# Patient Record
Sex: FEMALE | Race: WHITE
[De-identification: names, ages, dates, MRNs, and addresses within clinical notes are randomized per-mention and may not be internally consistent; named-entity substitution may affect disease eponyms.]

---

## 2018-04-01 ENCOUNTER — HOSPITAL ENCOUNTER (EMERGENCY)
Dept: HOSPITAL 61 - ER | Age: 33
Discharge: HOME | End: 2018-04-01
Payer: COMMERCIAL
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
## 2018-04-01 DIAGNOSIS — S60.021A: Primary | ICD-10-CM
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
## 2018-04-01 PROCEDURE — 99284 EMERGENCY DEPT VISIT MOD MDM: CPT

## 2018-04-01 PROCEDURE — 73140 X-RAY EXAM OF FINGER(S): CPT

## 2020-12-01 ENCOUNTER — HOSPITAL ENCOUNTER (EMERGENCY)
Dept: HOSPITAL 61 - ER | Age: 35
Discharge: HOME | End: 2020-12-01
Payer: COMMERCIAL

## 2020-12-01 VITALS — HEIGHT: 62 IN | BODY MASS INDEX: 23.94 KG/M2 | WEIGHT: 130.07 LBS

## 2020-12-01 VITALS — SYSTOLIC BLOOD PRESSURE: 117 MMHG | DIASTOLIC BLOOD PRESSURE: 67 MMHG

## 2020-12-01 DIAGNOSIS — N39.0: Primary | ICD-10-CM

## 2020-12-01 DIAGNOSIS — F17.200: ICD-10-CM

## 2020-12-01 DIAGNOSIS — R35.0: ICD-10-CM

## 2020-12-01 DIAGNOSIS — R10.9: ICD-10-CM

## 2020-12-01 LAB
APTT PPP: YELLOW S
BACTERIA #/AREA URNS HPF: (no result) /HPF
BILIRUB UR QL STRIP: NEGATIVE
FIBRINOGEN PPP-MCNC: (no result) MG/DL
NITRITE UR QL STRIP: POSITIVE
PH UR STRIP: 6 [PH]
PROT UR STRIP-MCNC: 100 MG/DL
RBC #/AREA URNS HPF: 0 /HPF (ref 0–2)
UROBILINOGEN UR-MCNC: 0.2 MG/DL
WBC #/AREA URNS HPF: (no result) /HPF (ref 0–4)

## 2020-12-01 PROCEDURE — 74018 RADEX ABDOMEN 1 VIEW: CPT

## 2020-12-01 PROCEDURE — 81001 URINALYSIS AUTO W/SCOPE: CPT

## 2020-12-01 PROCEDURE — 99284 EMERGENCY DEPT VISIT MOD MDM: CPT

## 2020-12-01 PROCEDURE — 87086 URINE CULTURE/COLONY COUNT: CPT

## 2020-12-01 PROCEDURE — 81025 URINE PREGNANCY TEST: CPT

## 2020-12-01 NOTE — PHYS DOC
Past Medical History


Past Medical History:  No Pertinent History


Past Surgical History:  No Surgical History


Smoking Status:  Current Every Day Smoker


Alcohol Use:  Occasionally


Drug Use:  None





General Adult


EDM:


Chief Complaint:  URINARY FREQUENCY





HPI:


HPI:





Patient is a 35  year old female who presents with right flank pain with urinary

frequency.  She states she is been taking Azo.  She states she gets frequent 

urine Allen tract infections.  She has a straight of kidney stones.  Patient is 

concerned that she has a kidney stone that is blocking.  Patient denies nausea, 

vomiting, abdominal pain, fever, diarrhea, cough, chest pain, shortness of 

breath.  She rates her pain a 5 out of 10 it is a dull aching pain.  Patient has

a history of urinary tract infections and kidney stone.





Review of Systems:


Review of Systems:


Constitutional:   Denies fever or chills. []


Eyes:   Denies change in visual acuity. []


HENT:   Denies nasal congestion or sore throat. [] 


Respiratory:   Denies cough or shortness of breath. [] 


Cardiovascular:   Denies chest pain or edema. [] 


GI:   Denies abdominal pain, nausea, vomiting, bloody stools or diarrhea. [] 


:  Denies dysuria. + Urinary frequency [] 


Musculoskeletal:   + Right flank back pain or denies joint pain. [] 


Integument:   Denies rash. [] 


Neurologic:   Denies headache, focal weakness or sensory changes. [] 


Endocrine:   Denies polyuria or polydipsia. [] 


Lymphatic:  Denies swollen glands. [] 


Psychiatric:  Denies depression or anxiety. []





Heart Score:


Risk Factors:


Risk Factors:  DM, Current or recent (<one month) smoker, HTN, HLP, family 

history of CAD, obesity.


Risk Scores:


Score 0 - 3:  2.5% MACE over next 6 weeks - Discharge Home


Score 4 - 6:  20.3% MACE over next 6 weeks - Admit for Clinical Observation


Score 7 - 10:  72.7% MACE over next 6 weeks - Early Invasive Strategies





Physical Exam:


PE:





Constitutional: Well developed, well nourished, no acute distress, non-toxic 

appearance. []


HENT: Normocephalic, atraumatic, bilateral external ears normal, oropharynx 

moist, no oral exudates, nose normal. []


Eyes: PERRLA, EOMI, conjunctiva normal, no discharge. [] 


Neck: Normal range of motion, no tenderness, supple, no stridor. [] 


Cardiovascular:Heart rate regular rhythm, no murmur []


Lungs & Thorax:  Bilateral breath sounds clear to auscultation []


Abdomen: Bowel sounds normal, soft, no tenderness, no masses, no pulsatile 

masses. [] 


Skin: Warm, dry, no erythema, no rash. [] 


Back: No tenderness, right CVA tenderness. [] 


Extremities: No tenderness, no cyanosis, no clubbing, ROM intact, no edema. [] 


Neurologic: Alert and oriented X 3, normal motor function, normal sensory 

function, no focal deficits noted. []


Psychologic: Affect normal, judgement normal, mood normal. []





Current Patient Data:


Labs:





                                Laboratory Tests








Test


 12/1/20


18:15 12/1/20


18:17


 


Urine Collection Type Unknown   


 


Urine Color Yellow   


 


Urine Clarity Cloudy   


 


Urine pH


 6.0 (<5.0-8.0)


 





 


Urine Specific Gravity


 1.015


(1.000-1.030) 





 


Urine Protein


 100 mg/dL


(NEG-TRACE) 





 


Urine Glucose (UA)


 Negative mg/dL


(NEG) 





 


Urine Ketones (Stick)


 Negative mg/dL


(NEG) 





 


Urine Blood


 Moderate (NEG)


 





 


Urine Nitrite


 Positive (NEG)


 





 


Urine Bilirubin


 Negative (NEG)


 





 


Urine Urobilinogen Dipstick


 0.2 mg/dL (0.2


mg/dL) 





 


Urine Leukocyte Esterase Large (NEG)   


 


Urine RBC 0 /HPF (0-2)   


 


Urine WBC


 Tntc /HPF


(0-4) 





 


Urine Bacteria


 Few /HPF


(0-FEW) 





 


Urine Mucus Mod /LPF   


 


POC Urine HCG, Qualitative


 


 Hcg negative


(Negative)











EKG:


EKG:


[]





Radiology/Procedures:


Radiology/Procedures:


[]





Course & Med Decision Making:


Course & Med Decision Making


Pertinent Labs and Imaging studies reviewed. (See chart for details)





See HPI.  Right CVA tenderness.  Abdomen soft and nontender.  Vital signs within

 normal limits.  Skin pink warm and dry.  Ambulatory with a steady gait.  Speaks

 in full complete sentences.  Alert and oriented x4.  Patient has a urinary 

tract infection with positive nitrites.  She will be sent home with Keflex and 

Pyridium.  Patient to follow-up with a primary care physician or a urologist.





KUB read by Dr Vazquez as no obvious kidney stone. 





[]





Dragon Disclaimer:


Dragon Disclaimer:


This electronic medical record was generated, in whole or in part, using a voice

 recognition dictation system.





Departure


Departure


Impression:  


   Primary Impression:  


   Urinary tract infection


   Qualified Codes:  N39.0 - Urinary tract infection, site not specified


Disposition:  01 DC HOME SELF CARE/HOMELESS


Condition:  STABLE


Referrals:  


NO PCP (PCP)


Patient Instructions:  Urinary Tract Infection





Additional Instructions:  


Follow-up with [facility] urology as you get frequent urinary tract infections.  We do 

not have urology here at this facility.  Take medication as prescribed and with 

food.  Drink plenty of water.


Scripts


Cephalexin (KEFLEX) 500 Mg Capsule


1 CAP PO BID for 7 Days, #14 CAP 0 Refills


   Prov: [facility]         12/1/20 


Phenazopyridine Hcl (PYRIDIUM) 100 Mg Tablet


1 TAB PO TID for urinary discomfort for 3 Days, #9 TAB 0 Refills


   Prov: [facility]         12/1/20











[facility]             Dec 1, 2020 18:55

## 2020-12-01 NOTE — RAD
Exam: Abdomen one view

 

INDICATION: Right upper quadrant abdominal pain

 

TECHNIQUE: Supine view the abdomen

 

Comparisons: None

 

FINDINGS:

There is stool noted throughout the colon to level the rectum in a 

nonobstructive bowel gas pattern. Large amount stool is noted in the 

colon.

 

Rounded calcification noted in the left upper quadrant measuring 8 mm. No 

suspicious masses.

 

Visualized osseous structures are unremarkable.

 

IMPRESSION:

1.  An 8 mm rounded calcification the left upper quadrant may represent a 

renal calculus or bowel contents.

2.  Moderate amount of stool in the colon. Nonobstructive bowel gas 

pattern.

 

Electronically signed by: Tello Vazquez MD (12/1/2020 7:10 PM) [facility]

## 2020-12-20 ENCOUNTER — HOSPITAL ENCOUNTER (INPATIENT)
Dept: HOSPITAL 61 - ER | Age: 35
LOS: 1 days | Discharge: HOME | DRG: 871 | End: 2020-12-21
Attending: INTERNAL MEDICINE | Admitting: INTERNAL MEDICINE
Payer: COMMERCIAL

## 2020-12-20 VITALS — DIASTOLIC BLOOD PRESSURE: 54 MMHG | SYSTOLIC BLOOD PRESSURE: 97 MMHG

## 2020-12-20 VITALS — DIASTOLIC BLOOD PRESSURE: 51 MMHG | SYSTOLIC BLOOD PRESSURE: 96 MMHG

## 2020-12-20 VITALS — SYSTOLIC BLOOD PRESSURE: 107 MMHG | DIASTOLIC BLOOD PRESSURE: 54 MMHG

## 2020-12-20 VITALS — HEIGHT: 62 IN | WEIGHT: 130.07 LBS | BODY MASS INDEX: 23.94 KG/M2

## 2020-12-20 VITALS — DIASTOLIC BLOOD PRESSURE: 66 MMHG | SYSTOLIC BLOOD PRESSURE: 114 MMHG

## 2020-12-20 DIAGNOSIS — N39.0: ICD-10-CM

## 2020-12-20 DIAGNOSIS — Z88.8: ICD-10-CM

## 2020-12-20 DIAGNOSIS — Z20.828: ICD-10-CM

## 2020-12-20 DIAGNOSIS — E87.2: ICD-10-CM

## 2020-12-20 DIAGNOSIS — E87.1: ICD-10-CM

## 2020-12-20 DIAGNOSIS — E87.6: ICD-10-CM

## 2020-12-20 DIAGNOSIS — N20.0: ICD-10-CM

## 2020-12-20 DIAGNOSIS — Z87.440: ICD-10-CM

## 2020-12-20 DIAGNOSIS — A41.9: Primary | ICD-10-CM

## 2020-12-20 DIAGNOSIS — Z88.2: ICD-10-CM

## 2020-12-20 DIAGNOSIS — E87.8: ICD-10-CM

## 2020-12-20 DIAGNOSIS — E43: ICD-10-CM

## 2020-12-20 DIAGNOSIS — F17.200: ICD-10-CM

## 2020-12-20 DIAGNOSIS — N12: ICD-10-CM

## 2020-12-20 DIAGNOSIS — N29: ICD-10-CM

## 2020-12-20 LAB
ALBUMIN SERPL-MCNC: 2.9 G/DL (ref 3.4–5)
ALBUMIN/GLOB SERPL: 0.7 {RATIO} (ref 1–1.7)
ALP SERPL-CCNC: 65 U/L (ref 46–116)
ALT SERPL-CCNC: 17 U/L (ref 14–59)
ANION GAP SERPL CALC-SCNC: 12 MMOL/L (ref 6–14)
APTT PPP: (no result) S
AST SERPL-CCNC: 14 U/L (ref 15–37)
BACTERIA #/AREA URNS HPF: (no result) /HPF
BASOPHILS # BLD AUTO: 0 X10^3/UL (ref 0–0.2)
BASOPHILS NFR BLD: 0 % (ref 0–3)
BILIRUB SERPL-MCNC: 0.5 MG/DL (ref 0.2–1)
BILIRUB UR QL STRIP: (no result)
BUN SERPL-MCNC: 10 MG/DL (ref 7–20)
BUN/CREAT SERPL: 13 (ref 6–20)
CALCIUM SERPL-MCNC: 9 MG/DL (ref 8.5–10.1)
CHLORIDE SERPL-SCNC: 96 MMOL/L (ref 98–107)
CO2 SERPL-SCNC: 24 MMOL/L (ref 21–32)
CREAT SERPL-MCNC: 0.8 MG/DL (ref 0.6–1)
EOSINOPHIL NFR BLD: 0 % (ref 0–3)
EOSINOPHIL NFR BLD: 0 X10^3/UL (ref 0–0.7)
ERYTHROCYTE [DISTWIDTH] IN BLOOD BY AUTOMATED COUNT: 13.5 % (ref 11.5–14.5)
FIBRINOGEN PPP-MCNC: CLEAR MG/DL
GFR SERPLBLD BASED ON 1.73 SQ M-ARVRAT: 82.6 ML/MIN
GLUCOSE SERPL-MCNC: 158 MG/DL (ref 70–99)
HCT VFR BLD CALC: 35.3 % (ref 36–47)
HGB BLD-MCNC: 12.1 G/DL (ref 12–15.5)
LIPASE: 102 U/L (ref 73–393)
LYMPHOCYTES # BLD: 0.7 X10^3/UL (ref 1–4.8)
LYMPHOCYTES NFR BLD AUTO: 8 % (ref 24–48)
MCH RBC QN AUTO: 30 PG (ref 25–35)
MCHC RBC AUTO-ENTMCNC: 34 G/DL (ref 31–37)
MCV RBC AUTO: 88 FL (ref 79–100)
MONO #: 1.1 X10^3/UL (ref 0–1.1)
MONOCYTES NFR BLD: 13 % (ref 0–9)
NEUT #: 6.7 X10^3/UL (ref 1.8–7.7)
NEUTROPHILS NFR BLD AUTO: 79 % (ref 31–73)
NITRITE UR QL STRIP: POSITIVE
PH UR STRIP: 6.5 [PH]
PLATELET # BLD AUTO: 174 X10^3/UL (ref 140–400)
POTASSIUM SERPL-SCNC: 3.4 MMOL/L (ref 3.5–5.1)
PROT SERPL-MCNC: 7.1 G/DL (ref 6.4–8.2)
PROT UR STRIP-MCNC: 100 MG/DL
RBC # BLD AUTO: 4 X10^6/UL (ref 3.5–5.4)
RBC #/AREA URNS HPF: (no result) /HPF (ref 0–2)
SODIUM SERPL-SCNC: 132 MMOL/L (ref 136–145)
UROBILINOGEN UR-MCNC: 4 MG/DL
WBC # BLD AUTO: 8.5 X10^3/UL (ref 4–11)
WBC #/AREA URNS HPF: >40 /HPF (ref 0–4)

## 2020-12-20 PROCEDURE — 81025 URINE PREGNANCY TEST: CPT

## 2020-12-20 PROCEDURE — 85025 COMPLETE CBC W/AUTO DIFF WBC: CPT

## 2020-12-20 PROCEDURE — 80053 COMPREHEN METABOLIC PANEL: CPT

## 2020-12-20 PROCEDURE — G0378 HOSPITAL OBSERVATION PER HR: HCPCS

## 2020-12-20 PROCEDURE — 83735 ASSAY OF MAGNESIUM: CPT

## 2020-12-20 PROCEDURE — 80048 BASIC METABOLIC PNL TOTAL CA: CPT

## 2020-12-20 PROCEDURE — 83690 ASSAY OF LIPASE: CPT

## 2020-12-20 PROCEDURE — 74176 CT ABD & PELVIS W/O CONTRAST: CPT

## 2020-12-20 PROCEDURE — 83605 ASSAY OF LACTIC ACID: CPT

## 2020-12-20 PROCEDURE — 87086 URINE CULTURE/COLONY COUNT: CPT

## 2020-12-20 PROCEDURE — U0003 INFECTIOUS AGENT DETECTION BY NUCLEIC ACID (DNA OR RNA); SEVERE ACUTE RESPIRATORY SYNDROME CORONAVIRUS 2 (SARS-COV-2) (CORONAVIRUS DISEASE [COVID-19]), AMPLIFIED PROBE TECHNIQUE, MAKING USE OF HIGH THROUGHPUT TECHNOLOGIES AS DESCRIBED BY CMS-2020-01-R: HCPCS

## 2020-12-20 PROCEDURE — 36415 COLL VENOUS BLD VENIPUNCTURE: CPT

## 2020-12-20 PROCEDURE — 87040 BLOOD CULTURE FOR BACTERIA: CPT

## 2020-12-20 PROCEDURE — 71045 X-RAY EXAM CHEST 1 VIEW: CPT

## 2020-12-20 PROCEDURE — 84100 ASSAY OF PHOSPHORUS: CPT

## 2020-12-20 PROCEDURE — 81001 URINALYSIS AUTO W/SCOPE: CPT

## 2020-12-20 RX ADMIN — BACITRACIN SCH MLS/HR: 5000 INJECTION, POWDER, FOR SOLUTION INTRAMUSCULAR at 12:15

## 2020-12-20 RX ADMIN — POTASSIUM CITRATE SCH MEQ: 10 TABLET ORAL at 13:57

## 2020-12-20 RX ADMIN — BACITRACIN SCH MLS/HR: 5000 INJECTION, POWDER, FOR SOLUTION INTRAMUSCULAR at 12:30

## 2020-12-20 RX ADMIN — MORPHINE SULFATE PRN MG: 4 INJECTION, SOLUTION INTRAMUSCULAR; INTRAVENOUS at 15:49

## 2020-12-20 RX ADMIN — BACITRACIN SCH MLS/HR: 5000 INJECTION, POWDER, FOR SOLUTION INTRAMUSCULAR at 23:10

## 2020-12-20 RX ADMIN — PIPERACILLIN SODIUM AND TAZOBACTAM SODIUM SCH MLS/HR: 3; .375 INJECTION, POWDER, LYOPHILIZED, FOR SOLUTION INTRAVENOUS at 18:05

## 2020-12-20 RX ADMIN — BACITRACIN SCH MLS/HR: 5000 INJECTION, POWDER, FOR SOLUTION INTRAMUSCULAR at 16:55

## 2020-12-20 RX ADMIN — MORPHINE SULFATE PRN MG: 4 INJECTION, SOLUTION INTRAMUSCULAR; INTRAVENOUS at 18:07

## 2020-12-20 RX ADMIN — PIPERACILLIN SODIUM AND TAZOBACTAM SODIUM SCH MLS/HR: 3; .375 INJECTION, POWDER, LYOPHILIZED, FOR SOLUTION INTRAVENOUS at 13:58

## 2020-12-20 RX ADMIN — KETOROLAC TROMETHAMINE PRN MG: 15 INJECTION, SOLUTION INTRAMUSCULAR; INTRAVENOUS at 18:48

## 2020-12-20 RX ADMIN — PIPERACILLIN SODIUM AND TAZOBACTAM SODIUM SCH MLS/HR: 3; .375 INJECTION, POWDER, LYOPHILIZED, FOR SOLUTION INTRAVENOUS at 23:12

## 2020-12-20 NOTE — RAD
EXAM:  XR CHEST 1V 12/20/2020 7:33 AM



CLINICAL INDICATION:  Fever



COMPARISON:  None



TECHNIQUE:  AP upright view of the chest



FINDINGS:  The heart and mediastinum are normal.  Lungs are well-expanded and clear.  No consolidatio
n, pleural effusion, or pneumothorax.  Pulmonary vascularity is normal. No acute osseous abnormality.




IMPRESSION: Normal chest radiograph.  



Electronically signed by: Halle Funez MD (12/20/2020 7:52 AM) [id number]

## 2020-12-20 NOTE — RAD
Exam: CT abdomen/pelvis without intravenous contrast



Indication: Bilateral flank pain. Nausea and vomiting, fever and chills for 5 days



Comparison: None



Technique: Helical CT imaging performed of the abdomen and pelvis without the use of intravenous cont
rast. Sagittal and coronal reformats were obtained. 



One or more of the following individualized dose reduction techniques were utilized for this examinat
ion:  



1. Automated exposure control

2. Adjustment of the mA and/or kV according to patient size

3. Use of iterative reconstruction technique.



Findings:



Inherently limited evaluation without intravenous contrast.



Lower chest: Lung bases are clear. The heart is normal in size.



Liver: The liver is enlarged measuring 21 cm craniocaudally.



Gallbladder/Biliary Tree: Normal.



Pancreas: Normal.



Spleen: The spleen is at the upper limit of normal measuring 13 cm in AP diameter.



Adrenal Glands: Normal.



Kidneys/Ureters/Bladder: There is mild left hydronephrosis and perinephric fat stranding. There is a 
punctate calculus in the inferior left renal pole but no obstructing calculus in the left renal pelvi
s or ureter. A calcification in the pelvis on the left is likely phlebolith. There are multiple smudg
y calcifications in the right kidney along the corticomedullary junction consistent with medullary ne
phrocalcinosis. No right hydronephrosis. The right ureter and bladder are normal.



Reproductive Organs: Uterus is retroverted. No adnexal mass. A tampon is in the vagina.



Stomach, small bowel, and colon: Stomach, small bowel, colon, and appendix are normal.



Vasculature: Abdominal aorta is normal in caliber.



Lymph Nodes: There is no lymphadenopathy.



Peritoneum and retroperitoneum: No free fluid or free air.



Bones: No acute osseous abnormality.



Impression:



1.  Left nephrolithiasis without obstructing calculus identified. This could be related to recent pas
sed stone.



2.  Punctate calculus in the left kidney and calcifications in the right kidney consistent with medul
lety nephrocalcinosis.

 

3. Mild hepatomegaly.



Electronically signed by: Halle Funez MD (12/20/2020 8:09 AM) [id number]

## 2020-12-20 NOTE — PDOC1
History and Physical


Date of Service:


DOS:


DATE: 12/20/20 


TIME: 08:57





Chief Complaint:


Chief Complain:


Pain on urination





History of Present Illness:


HPI:


PT TO ED VIA POV, PT TAKEN TO ER ROOM 6. PT C/O BILAT FLANK PAIN, NAUSEA,


   VOMITING, UTI SYMPTOMS FOR 5 DAYS. PT REPORTS SHE WAS SEEN HERE 5 DAYS AGO 

   FOR


   UTI AND STATES SHE DID NOT FOLLOWUP WITH UROLOGY. PT REPORTS SHES HAD FEVERS,


   CHILLS AND COUGH, STATES POSSIBLE EXPOSURE AT WORK FOR COVID AT [email or web address].





 35  year old presents with chief complaint of dysuria.  Patient was here at the

beginning of December was diagnosed with urinary tract infection.  Over the last

3 to 4 days patient's had progressively worsening symptoms that consist of back 

pain with dysuria.  Patient had also a headache and subjective fever and chills.

 Patient had some nausea as well.  Symptoms are worse with urination and 

palpation of her back.  Patient states her pain is in the back and radiates to 

her abdomen.  Pain is moderate at rest and more severe at times and severity.  

Symptoms are somewhat relieved with Tylenol but not completely.





Past Medical/Surgical History:


PMH/PSH:


Past Medical History:  Kidney Infection, Kidney Stone, UTI


Past Surgical History:  No Surgical History





Allergies:


Allergies:  


Coded Allergies:  


     Sulfa (Sulfonamide Antibiotics) (Verified  Allergy, Intermediate, HIVES, 

12/1/20)


     sulfamethoxazole (Verified  Allergy, Intermediate, HIVES, 12/1/20)


     trimethoprim (Verified  Allergy, Intermediate, HIVES, 12/1/20)





Family History:


Family History:


Reviewed with no relevant findings





Social History:


Social History:


Smoking Status:  Current Every Day Smoker


Alcohol Use:  Occasionally


Drug Use:  None





Current Medications:


Current Medications





Current Medications


Ketorolac Tromethamine (Toradol 15mg Vial) 15 mg 1X  ONCE IVP  Last administered

on 12/20/20at 07:56;  Start 12/20/20 at 07:30;  Stop 12/20/20 at 07:31;  Status 

DC


Ondansetron HCl (Zofran) 4 mg 1X  ONCE IVP  Last administered on 12/20/20at 

07:55;  Start 12/20/20 at 07:30;  Stop 12/20/20 at 07:31;  Status DC


Sodium Chloride 1,000 ml @  1,000 mls/hr 1X  ONCE IV  Last administered on 

12/20/20at 07:55;  Start 12/20/20 at 07:30;  Stop 12/20/20 at 08:29;  Status DC


Ceftriaxone Sodium (Rocephin) 1 gm 1X  ONCE IVP  Last administered on 12/20/20at

07:58;  Start 12/20/20 at 07:30;  Stop 12/20/20 at 07:31;  Status DC


Ondansetron HCl (Zofran) 4 mg PRN Q8HRS  PRN IV NAUSEA/VOMITING;  Start 12/20/20

at 09:00;  Stop 12/21/20 at 08:59


Sodium Chloride 1,000 ml @  125 mls/hr Q8H IV ;  Start 12/20/20 at 09:00;  Stop 

12/21/20 at 08:59


Acetaminophen (Tylenol) 650 mg PRN Q4HRS  PRN PO FEVER > 100.3'F;  Start 

12/20/20 at 09:00;  Stop 12/21/20 at 08:59





Active Scripts


Active


Keflex (Cephalexin) 500 Mg Capsule 1 Cap PO BID 7 Days


Pyridium (Phenazopyridine Hcl) 100 Mg Tablet 1 Tab PO TID 3 Days





ROS:


Review of Systems


Review of System


REVIEW OF SYSTEMS:


GENERAL:  Denies weakness


SKIN:  No bruising, hair changes or rashes.


EYES:  No blurred, double or loss of vision.


NOSE AND THROAT:  No history of nosebleeds, hoarseness or sore throat.


HEART:  No history of palpitations, chest pain or shortness of breath on


exertion.


LUNGS:  Denies cough, hemoptysis, wheezing or shortness of breath.


GASTROINTESTINAL:  Denies changes in appetite, nausea, vomiting, diarrhea or


constipation.


GENITOURINARY:  No history of frequency, urgency, hesitancy or nocturia.


NEUROLOGIC:  Denies history of numbness, tingling, or tremor.


PSYCHIATRIC:  No history of panic, anxiety or depression.


ENDOCRINE:  No history of heat or cold intolerance, polyuria or polydipsia.


EXTREMITIES:  Denies joint pain, pain on walking or stiffness.





Physical Exam:


Vital Signs:





Vital Signs








  Date Time  Temp Pulse Resp B/P (MAP) Pulse Ox O2 Delivery O2 Flow Rate FiO2


 


12/20/20 07:20 99.3 131 20 113/59 (77) 96 Room Air  





 99.3       








Physcial Exam:


GEN:   No apparent distress.  Alert and oriented


HEENT:   Normal cephalic, atraumatic, external auditory canals are patent


EYES:   Extraocular muscles are intact, pupil are equally round and reactive to 

light and accommodation


MUSCULOSKELETAL:  Well developed , well nourished, good range of motion


ENDOCRINE:   No thyromegaly was palpated


LYMPHATICS:   No cervical chain or axillary nodes were noted


HEMATOPOIETIC:  No bruising


NECK:   Supple, no JVD, no thyromegaly was noted


LUNGS:   Clear to auscultation in all lung fields without rhonchi or wheezing


HEART:    RRR, S!, S2 present.  Peripheral pulses intact, no obvious murmurs 

noted


ABDOMEN:   Soft, nontender.  Positive bowel sounds, no organomegaly, normal 

bowel sounds


EXTREMITIES:   Without clubbing, cyanosis, or edema.  Pedal pulses intact.  

Negative Homans sign


NEUROLOGIC:   Normal speech and tone.  A&O x 3, moves all extremities, no 

obvious focal deficits


PSYCHIATRIC:   Normal affect, normal mood. Stable


SKIN:   No ulcerations or rashes, good skin turgor, no jaundice


VASCULAR:   Good capillary refill, neurovascular bundle appears to be intact





Labs:


Labs:





Laboratory Tests








Test


 12/20/20


07:21 12/20/20


07:30


 


Bedside Urine HCG, Qualitative


 Hcg negative


(Negative) 





 


White Blood Count


 


 8.5 x10^3/uL


(4.0-11.0)


 


Red Blood Count


 


 4.00 x10^6/uL


(3.50-5.40)


 


Hemoglobin


 


 12.1 g/dL


(12.0-15.5)


 


Hematocrit


 


 35.3 %


(36.0-47.0)


 


Mean Corpuscular Volume  88 fL () 


 


Mean Corpuscular Hemoglobin  30 pg (25-35) 


 


Mean Corpuscular Hemoglobin


Concent 


 34 g/dL


(31-37)


 


Red Cell Distribution Width


 


 13.5 %


(11.5-14.5)


 


Platelet Count


 


 174 x10^3/uL


(140-400)


 


Neutrophils (%) (Auto)  79 % (31-73) 


 


Lymphocytes (%) (Auto)  8 % (24-48) 


 


Monocytes (%) (Auto)  13 % (0-9) 


 


Eosinophils (%) (Auto)  0 % (0-3) 


 


Basophils (%) (Auto)  0 % (0-3) 


 


Neutrophils # (Auto)


 


 6.7 x10^3/uL


(1.8-7.7)


 


Lymphocytes # (Auto)


 


 0.7 x10^3/uL


(1.0-4.8)


 


Monocytes # (Auto)


 


 1.1 x10^3/uL


(0.0-1.1)


 


Eosinophils # (Auto)


 


 0.0 x10^3/uL


(0.0-0.7)


 


Basophils # (Auto)


 


 0.0 x10^3/uL


(0.0-0.2)


 


Urine Collection Type  Unknown 


 


Urine Color  Emilie 


 


Urine Clarity  Clear 


 


Urine pH  6.5 (<5.0-8.0) 


 


Urine Specific Gravity


 


 1.020


(1.000-1.030)


 


Urine Protein


 


 100 mg/dL


(NEG-TRACE)


 


Urine Glucose (UA)


 


 Negative mg/dL


(NEG)


 


Urine Ketones (Stick)


 


 Negative mg/dL


(NEG)


 


Urine Blood  Small (NEG) 


 


Urine Nitrite  Positive (NEG) 


 


Urine Bilirubin  Small (NEG) 


 


Urine Urobilinogen Dipstick


 


 4.0 mg/dL (0.2


mg/dL)


 


Urine Leukocyte Esterase  Moderate (NEG) 


 


Urine RBC  3-5 /HPF (0-2) 


 


Urine WBC  >40 /HPF (0-4) 


 


Urine Squamous Epithelial


Cells 


 Mod /LPF 





 


Urine Bacteria


 


 Many /HPF


(0-FEW)


 


Urine Mucus  Mod /LPF 


 


Sodium Level


 


 132 mmol/L


(136-145)


 


Potassium Level


 


 3.4 mmol/L


(3.5-5.1)


 


Chloride Level


 


 96 mmol/L


()


 


Carbon Dioxide Level


 


 24 mmol/L


(21-32)


 


Anion Gap  12 (6-14) 


 


Blood Urea Nitrogen


 


 10 mg/dL


(7-20)


 


Creatinine


 


 0.8 mg/dL


(0.6-1.0)


 


Estimated GFR


(Cockcroft-Gault) 


 82.6 





 


BUN/Creatinine Ratio  13 (6-20) 


 


Glucose Level


 


 158 mg/dL


(70-99)


 


Lactic Acid Level


 


 2.1 mmol/L


(0.4-2.0)


 


Calcium Level


 


 9.0 mg/dL


(8.5-10.1)


 


Total Bilirubin


 


 0.5 mg/dL


(0.2-1.0)


 


Aspartate Amino Transf


(AST/SGOT) 


 14 U/L (15-37) 





 


Alanine Aminotransferase


(ALT/SGPT) 


 17 U/L (14-59) 





 


Alkaline Phosphatase


 


 65 U/L


()


 


Total Protein


 


 7.1 g/dL


(6.4-8.2)


 


Albumin


 


 2.9 g/dL


(3.4-5.0)


 


Albumin/Globulin Ratio  0.7 (1.0-1.7) 


 


Lipase


 


 102 U/L


()








Laboratory Tests








Test


 12/20/20


07:21 12/20/20


07:30


 


Bedside Urine HCG, Qualitative


 Hcg negative


(Negative) 





 


White Blood Count


 


 8.5 x10^3/uL


(4.0-11.0)


 


Red Blood Count


 


 4.00 x10^6/uL


(3.50-5.40)


 


Hemoglobin


 


 12.1 g/dL


(12.0-15.5)


 


Hematocrit


 


 35.3 %


(36.0-47.0)


 


Mean Corpuscular Volume  88 fL () 


 


Mean Corpuscular Hemoglobin  30 pg (25-35) 


 


Mean Corpuscular Hemoglobin


Concent 


 34 g/dL


(31-37)


 


Red Cell Distribution Width


 


 13.5 %


(11.5-14.5)


 


Platelet Count


 


 174 x10^3/uL


(140-400)


 


Neutrophils (%) (Auto)  79 % (31-73) 


 


Lymphocytes (%) (Auto)  8 % (24-48) 


 


Monocytes (%) (Auto)  13 % (0-9) 


 


Eosinophils (%) (Auto)  0 % (0-3) 


 


Basophils (%) (Auto)  0 % (0-3) 


 


Neutrophils # (Auto)


 


 6.7 x10^3/uL


(1.8-7.7)


 


Lymphocytes # (Auto)


 


 0.7 x10^3/uL


(1.0-4.8)


 


Monocytes # (Auto)


 


 1.1 x10^3/uL


(0.0-1.1)


 


Eosinophils # (Auto)


 


 0.0 x10^3/uL


(0.0-0.7)


 


Basophils # (Auto)


 


 0.0 x10^3/uL


(0.0-0.2)


 


Urine Collection Type  Unknown 


 


Urine Color  Emilie 


 


Urine Clarity  Clear 


 


Urine pH  6.5 (<5.0-8.0) 


 


Urine Specific Gravity


 


 1.020


(1.000-1.030)


 


Urine Protein


 


 100 mg/dL


(NEG-TRACE)


 


Urine Glucose (UA)


 


 Negative mg/dL


(NEG)


 


Urine Ketones (Stick)


 


 Negative mg/dL


(NEG)


 


Urine Blood  Small (NEG) 


 


Urine Nitrite  Positive (NEG) 


 


Urine Bilirubin  Small (NEG) 


 


Urine Urobilinogen Dipstick


 


 4.0 mg/dL (0.2


mg/dL)


 


Urine Leukocyte Esterase  Moderate (NEG) 


 


Urine RBC  3-5 /HPF (0-2) 


 


Urine WBC  >40 /HPF (0-4) 


 


Urine Squamous Epithelial


Cells 


 Mod /LPF 





 


Urine Bacteria


 


 Many /HPF


(0-FEW)


 


Urine Mucus  Mod /LPF 


 


Sodium Level


 


 132 mmol/L


(136-145)


 


Potassium Level


 


 3.4 mmol/L


(3.5-5.1)


 


Chloride Level


 


 96 mmol/L


()


 


Carbon Dioxide Level


 


 24 mmol/L


(21-32)


 


Anion Gap  12 (6-14) 


 


Blood Urea Nitrogen


 


 10 mg/dL


(7-20)


 


Creatinine


 


 0.8 mg/dL


(0.6-1.0)


 


Estimated GFR


(Cockcroft-Gault) 


 82.6 





 


BUN/Creatinine Ratio  13 (6-20) 


 


Glucose Level


 


 158 mg/dL


(70-99)


 


Lactic Acid Level


 


 2.1 mmol/L


(0.4-2.0)


 


Calcium Level


 


 9.0 mg/dL


(8.5-10.1)


 


Total Bilirubin


 


 0.5 mg/dL


(0.2-1.0)


 


Aspartate Amino Transf


(AST/SGOT) 


 14 U/L (15-37) 





 


Alanine Aminotransferase


(ALT/SGPT) 


 17 U/L (14-59) 





 


Alkaline Phosphatase


 


 65 U/L


()


 


Total Protein


 


 7.1 g/dL


(6.4-8.2)


 


Albumin


 


 2.9 g/dL


(3.4-5.0)


 


Albumin/Globulin Ratio  0.7 (1.0-1.7) 


 


Lipase


 


 102 U/L


()











Images:


Images


CXR





Impression: 


1.  No acute cardiopulmonary process.





CT ABD/PELVIS





Impression:





1.  Left nephrolithiasis without obstructing calculus identified. This could be 

related to recent passed stone.





2.  Punctate calculus in the left kidney and calcifications in the right kidney 

consistent with medullary nephrocalcinosis.


 


3. Mild hepatomegaly.





Assessment/Plan


Assessment/Plan


Investigation for Covid 19 infection


Acute UTI possibly  pyelonephritis


Multiple nephro lithiasis bilateral


Acute electrolyte derangementhyponatremia, hypokalemia, hypochloremia 

suggestive of volume depletion


Severe protein malnutrition


Lactic acidemia


Tobacco misuse





Admit to medicine for further management


Continue empiric IV antibiotics


Continue IV fluids


Pending urine culture


We will start potassium citrate and tamsulosin for kidney stone prevention


Counseled on lemon juice water and increasing hydration and smoking cessation to

reduce kidney stones.


Strain urine


Recommend outpatient urology evaluation


Ambulation and SCD for DVT prophylaxis


Regular diet


Full code


Discussed with RN and SW


Disposition pending urine culture


Surrogate decision maker is undesignated





Justifications for Admission


Other Justification














PARISA CEDEÑO MD                    Dec 20, 2020 08:59

## 2020-12-20 NOTE — PHYS DOC
Past Medical History


Past Medical History:  Kidney Infection, Kidney Stone, UTI


Past Surgical History:  No Surgical History


Smoking Status:  Current Every Day Smoker


Alcohol Use:  Occasionally


Drug Use:  None





General Adult


EDM:


Chief Complaint:  PAIN ON URINATION





HPI:


HPI:





Patient is a 35  year old presents with chief complaint of dysuria.  Patient was

here at the beginning of December was diagnosed with urinary tract infection.  

Over the last 3 to 4 days patient's had progressively worsening symptoms that 

consist of back pain with dysuria.  Patient had also a headache and subjective 

fever and chills.  Patient had some nausea as well.  Symptoms are worse with 

urination and palpation of her back.  Patient states her pain is in the back and

radiates to her abdomen.  Pain is moderate at rest and more severe at times and 

severity.  Symptoms are somewhat relieved with Tylenol but not completely.





Review of Systems:


Review of Systems:


Constitutional:   Patient complains of chills and subjective fever


Eyes:   Denies change in visual acuity. []


HENT:   Denies nasal congestion or sore throat. [] 


Respiratory:   Patient's had a mild cough but no shortness of breath. [] 


Cardiovascular:   Denies chest pain or edema. [] 


GI: Complains abdominal pain with nausea, no, bloody stools or diarrhea. [] 


: Complains of dysuria


Musculoskeletal:   Complains of back pain but no joint pain. [] 


Integument:   Denies rash. [] 


Neurologic:   Complains of headache but no focal weakness or sensory changes. []

 


Endocrine:   Denies polyuria or polydipsia. [] 


Lymphatic:  Denies swollen glands. [] 


Psychiatric:  Denies depression or anxiety. []





Heart Score:


Risk Factors:


Risk Factors:  DM, Current or recent (<one month) smoker, HTN, HLP, family 

history of CAD, obesity.


Risk Scores:


Score 0 - 3:  2.5% MACE over next 6 weeks - Discharge Home


Score 4 - 6:  20.3% MACE over next 6 weeks - Admit for Clinical Observation


Score 7 - 10:  72.7% MACE over next 6 weeks - Early Invasive Strategies





Current Medications:





Current Medications


Ketorolac Tromethamine (Toradol 15mg Vial) 15 mg 1X  ONCE IVP  Last administered

 on 20at 07:56;  Start 20 at 07:30;  Stop 20 at 07:31;  Status

 DC


Ondansetron HCl (Zofran) 4 mg 1X  ONCE IVP  Last administered on 20at 

07:55;  Start 20 at 07:30;  Stop 20 at 07:31;  Status DC


Sodium Chloride 1,000 ml @  1,000 mls/hr 1X  ONCE IV  Last administered on 

20at 07:55;  Start 20 at 07:30;  Stop 20 at 08:29;  Status DC


Ceftriaxone Sodium (Rocephin) 1 gm 1X  ONCE IVP  Last administered on 20at

 07:58;  Start 20 at 07:30;  Stop 20 at 07:31;  Status DC





Active Scripts


Active


Keflex (Cephalexin) 500 Mg Capsule 1 Cap PO BID 7 Days


Pyridium (Phenazopyridine Hcl) 100 Mg Tablet 1 Tab PO TID 3 Days





Allergies:


Allergies:





Allergies








Coded Allergies Type Severity Reaction Last Updated Verified


 


  Sulfa (Sulfonamide Antibiotics) Allergy Intermediate HIVES 20 Yes


 


  sulfamethoxazole Allergy Intermediate HIVES 20 Yes


 


  trimethoprim Allergy Intermediate HIVES 20 Yes











Physical Exam:


PE:





Constitutional: Well developed, well nourished, MILD DISTRESS,non-toxic 

appearance. []


HENT: Normocephalic, atraumatic, bilateral external ears normal, no trismus, 

nose normal. []


Eyes: PERRLA, EOMI, conjunctiva normal, no discharge. [] 


Neck: Normal range of motion, no tenderness, supple, no stridor. [] 


Cardiovascular:TACHYCARDIA no murmur []


Lungs & Thorax:  Bilateral breath sounds clear, no respiratory distress


Abdomen: Soft with mild lower abdominal tenderness, no guarding, no rebound no 

masses, no pulsatile masses. [] 


Skin: Warm, dry, no erythema, no rash. [] 


Back: No tenderness, bilateral CVA tenderness


Extremities: No tenderness, no cyanosis, no clubbing, ROM intact, no edema. [] 


Neurologic: Alert and oriented X 3, normal motor function, normal sensory 

function, no focal deficits noted. []


Psychologic: Affect normal, judgement normal, mood normal. []





Current Patient Data:


Labs:





Laboratory Tests








Test


 20


07:21 20


07:30


 


Bedside Urine HCG, Qualitative Hcg negative  


 


White Blood Count  8.5 x10^3/uL 


 


Red Blood Count  4.00 x10^6/uL 


 


Hemoglobin  12.1 g/dL 


 


Hematocrit  35.3 % 


 


Mean Corpuscular Volume  88 fL 


 


Mean Corpuscular Hemoglobin  30 pg 


 


Mean Corpuscular Hemoglobin


Concent 


 34 g/dL 





 


Red Cell Distribution Width  13.5 % 


 


Platelet Count  174 x10^3/uL 


 


Neutrophils (%) (Auto)  79 % 


 


Lymphocytes (%) (Auto)  8 % 


 


Monocytes (%) (Auto)  13 % 


 


Eosinophils (%) (Auto)  0 % 


 


Basophils (%) (Auto)  0 % 


 


Neutrophils # (Auto)  6.7 x10^3/uL 


 


Lymphocytes # (Auto)  0.7 x10^3/uL 


 


Monocytes # (Auto)  1.1 x10^3/uL 


 


Eosinophils # (Auto)  0.0 x10^3/uL 


 


Basophils # (Auto)  0.0 x10^3/uL 


 


Urine Collection Type  Unknown 


 


Urine Color  Emilie 


 


Urine Clarity  Clear 


 


Urine pH  6.5 


 


Urine Specific Gravity  1.020 


 


Urine Protein  100 mg/dL 


 


Urine Glucose (UA)  Negative mg/dL 


 


Urine Ketones (Stick)  Negative mg/dL 


 


Urine Blood  Small 


 


Urine Nitrite  Positive 


 


Urine Bilirubin  Small 


 


Urine Urobilinogen Dipstick  4.0 mg/dL 


 


Urine Leukocyte Esterase  Moderate 


 


Urine RBC  3-5 /HPF 


 


Urine WBC  >40 /HPF 


 


Urine Squamous Epithelial


Cells 


 Mod /LPF 





 


Urine Bacteria  Many /HPF 


 


Urine Mucus  Mod /LPF 


 


Sodium Level  132 mmol/L 


 


Potassium Level  3.4 mmol/L 


 


Chloride Level  96 mmol/L 


 


Carbon Dioxide Level  24 mmol/L 


 


Anion Gap  12 


 


Blood Urea Nitrogen  10 mg/dL 


 


Creatinine  0.8 mg/dL 


 


Estimated GFR


(Cockcroft-Gault) 


 82.6 





 


BUN/Creatinine Ratio  13 


 


Glucose Level  158 mg/dL 


 


Lactic Acid Level  2.1 mmol/L 


 


Calcium Level  9.0 mg/dL 


 


Total Bilirubin  0.5 mg/dL 


 


Aspartate Amino Transf


(AST/SGOT) 


 14 U/L 





 


Alanine Aminotransferase


(ALT/SGPT) 


 17 U/L 





 


Alkaline Phosphatase  65 U/L 


 


Total Protein  7.1 g/dL 


 


Albumin  2.9 g/dL 


 


Albumin/Globulin Ratio  0.7 


 


Lipase  102 U/L 








Current Medications








 Medications


  (Trade)  Dose


 Ordered  Sig/Gurwinder


 Route


 PRN Reason  Start Time


 Stop Time Status Last Admin


Dose Admin


 


 Ketorolac


 Tromethamine


  (Toradol 15mg


 Vial)  15 mg  1X  ONCE


 IVP


   20 07:30


 20 07:31 DC 20 07:56





 


 Ondansetron HCl


  (Zofran)  4 mg  1X  ONCE


 IVP


   20 07:30


 20 07:31 DC 20 07:55





 


 Sodium Chloride  1,000 ml @ 


 1,000 mls/hr  1X  ONCE


 IV


   20 07:30


 20 08:29 DC 20 07:55





 


 Ceftriaxone Sodium


  (Rocephin)  1 gm  1X  ONCE


 IVP


   20 07:30


 20 07:31 DC 20 07:58











                                Laboratory Tests








Test


 20


07:21


 


POC Urine HCG, Qualitative


 Hcg negative


(Negative)








Vital Signs:





Vital Signs








  Date Time  Temp Pulse Resp B/P (MAP) Pulse Ox O2 Delivery O2 Flow Rate FiO2


 


20 07:20 99.3 131 20 113/59 (77) 96 Room Air  





 99.3       











EKG:


EKG:


[]





Radiology/Procedures:


Radiology/Procedures:


[][facility]


                    [address] [place] [address]


                                 [phone]


                                        


                                 IMAGING REPORT





                                     Signed





PATIENT: KAVITHA MENDEZ  ACCOUNT: [id number]     MRN#: [id number]


: 1985           LOCATION: ER              AGE: 35


SEX: F                    EXAM DT: 20         ACCESSION#: [id number]


STATUS: REG ER            ORD. PHYSICIAN: MARIALUISA LOERA MD


REASON: BILATERAL FLANK PAIN, N/V, CHILLS, FEVER X 5 DAYS


PROCEDURE: CT ABDOMEN PELVIS WO CONTRAST





Exam: CT abdomen/pelvis without intravenous contrast





Indication: Bilateral flank pain. Nausea and vomiting, fever and chills for 5 

days





Comparison: None





Technique: Helical CT imaging performed of the abdomen and pelvis without the 

use of intravenous contrast. Sagittal and coronal reformats were obtained. 





One or more of the following individualized dose reduction techniques were 

utilized for this examination:  





1. Automated exposure control


2. Adjustment of the mA and/or kV according to patient size


3. Use of iterative reconstruction technique.





Findings:





Inherently limited evaluation without intravenous contrast.





Lower chest: Lung bases are clear. The heart is normal in size.





Liver: The liver is enlarged measuring 21 cm craniocaudally.





Gallbladder/Biliary Tree: Normal.





Pancreas: Normal.





Spleen: The spleen is at the upper limit of normal measuring 13 cm in AP 

diameter.





Adrenal Glands: Normal.





Kidneys/Ureters/Bladder: There is mild left hydronephrosis and perinephric fat 

stranding. There is a punctate calculus in the inferior left renal pole but no 

obstructing calculus in the left renal pelvis or ureter. A calcification in the 

pelvis on the left is likely phlebolith. There are multiple smudgy 

calcifications in the right kidney along the corticomedullary junction 

consistent with medullary nephrocalcinosis. No right hydronephrosis. The right 

ureter and bladder are normal.





Reproductive Organs: Uterus is retroverted. No adnexal mass. A tampon is in the 

vagina.





Stomach, small bowel, and colon: Stomach, small bowel, colon, and appendix are 

normal.





Vasculature: Abdominal aorta is normal in caliber.





Lymph Nodes: There is no lymphadenopathy.





Peritoneum and retroperitoneum: No free fluid or free air.





Bones: No acute osseous abnormality.





Impression:





1.  Left nephrolithiasis without obstructing calculus identified. This could be 

related to recent passed stone.





2.  Punctate calculus in the left kidney and calcifications in the right kidney 

consistent with medullary nephrocalcinosis.


 


3. Mild hepatomegaly.





Electronically signed by: Halle Funez MD (2020 8:09 AM) [id number]














DICTATED and SIGNED BY:     HALLE FUNEZ MD


DATE:     20 [id number] 0


                            [facility]


                    [address]  [place], [place] [address] [phone]


                                        


                                 IMAGING REPORT





                                     Signed





PATIENT: KAVITHA MENDEZ  ACCOUNT: [id number]     MRN#: [id number]


: 1985           LOCATION: ER              AGE: 35


SEX: F                    EXAM DT: 20         ACCESSION#: [id number]


STATUS: REG ER            ORD. PHYSICIAN: MARIALUISA LOERA MD


REASON: fever


PROCEDURE: PORTABLE CHEST 1V





EXAM:  XR CHEST 1V 2020 7:33 AM





CLINICAL INDICATION:  Fever





COMPARISON:  None





TECHNIQUE:  AP upright view of the chest





FINDINGS:  The heart and mediastinum are normal.  Lungs are well-expanded and 

clear.  No consolidation, pleural effusion, or pneumothorax.  Pulmonary 

vascularity is normal. No acute osseous abnormality.





IMPRESSION: Normal chest radiograph.  





Electronically signed by: Halle Funez MD (2020 7:52 AM) [id number]














DICTATED and SIGNED BY:     HALLE FUNEZ MD


DATE:     20 [id number] 0





Course & Med Decision Making:


Course & Med Decision Making


Pertinent Labs and Imaging studies reviewed. (See chart for details)





[] 35-year-old female presents with UTI symptoms.  Patient underwent a CAT scan 

to rule out ureterolithiasis.  Patient tachycardic with chills and a subjective 

fever.  Patient given IV fluids and antibiotics.  Patient has a mildly elevated 

lactic acid.  On reassessment patient's heart rates is under 100 and her 

systolic blood pressure is in the 90s.  Patient appears to have early sepsis and

 will need IV fluids and antibiotics in the hospital.  Patient will be admitted 

to Dr. Cedeño.  Patient also has a cough, and works at [employer] and we swabbed for 

COVID-19.





Dragon Disclaimer:


Dragon Disclaimer:


This electronic medical record was generated, in whole or in part, using a voice

 recognition dictation system.





Date and Time of Reassessment


Date:  Dec 20, 2020


Time:  08:50





Fluid Challenge


Is the fluid challenge complet:  Yes


IBW Target Volume Used:  No


BMI > 30:  No





Vital Signs


Vital Signs:





Vital Signs








  Date Time  Temp Pulse Resp B/P (MAP) Pulse Ox O2 Delivery O2 Flow Rate FiO2


 


20 07:20 99.3 131 20 113/59 (77) 96 Room Air  





 99.3       








Vital Signs








  Date Time  Temp Pulse Resp B/P (MAP) Pulse Ox O2 Delivery O2 Flow Rate FiO2


 


20 07:20 99.3 131 20 113/59 (77) 96 Room Air  





 99.3       





HR 90'S, SBP 90'S AT 8:50


Temperature Source:  Oral





Respirations


Respiratory Effort:  Normal


Respiratory Pattern:  Normal





Cardiovascular


Pulse Rhythm:  Regular


Heart:  Nml rate, reg. rhythm





Capillary Refil


Capillary Refill:  Rt Hand < 3 seconds





Peripheral Pulse


Pulse Location:  Radial


Pulse Strength:  Normal (2+)


Pulse Assessment Method:  NIBP





Integumentary


Skin:  Warm, Dry


Skin Moisture:  Dry


Skin Turgor:  Normal


Skin Color:  warm, dry


Fingernail Color:  WNL





Departure


Departure


Impression:  


   Primary Impression:  


   Urinary tract infection


   Additional Impressions:  


   Pyelonephritis


   Sepsis


   Flank pain


   Suspected COVID-19 virus infection


Disposition:   ADMITTED AS INPT THIS HOSP


Admitting Physician:  HIMS (CEDEÑO)


Condition:  STABLE


Referrals:  


NO PCP (PCP)











MARIALUISA LOERA MD              Dec 20, 2020 07:30

## 2020-12-21 VITALS — DIASTOLIC BLOOD PRESSURE: 53 MMHG | SYSTOLIC BLOOD PRESSURE: 120 MMHG

## 2020-12-21 VITALS — SYSTOLIC BLOOD PRESSURE: 119 MMHG | DIASTOLIC BLOOD PRESSURE: 59 MMHG

## 2020-12-21 VITALS — SYSTOLIC BLOOD PRESSURE: 120 MMHG | DIASTOLIC BLOOD PRESSURE: 53 MMHG

## 2020-12-21 LAB
ANION GAP SERPL CALC-SCNC: 7 MMOL/L (ref 6–14)
BASOPHILS # BLD AUTO: 0 X10^3/UL (ref 0–0.2)
BASOPHILS NFR BLD: 0 % (ref 0–3)
BUN SERPL-MCNC: 8 MG/DL (ref 7–20)
CALCIUM SERPL-MCNC: 8.1 MG/DL (ref 8.5–10.1)
CHLORIDE SERPL-SCNC: 103 MMOL/L (ref 98–107)
CO2 SERPL-SCNC: 27 MMOL/L (ref 21–32)
CREAT SERPL-MCNC: 0.7 MG/DL (ref 0.6–1)
EOSINOPHIL NFR BLD: 0 % (ref 0–3)
EOSINOPHIL NFR BLD: 0 X10^3/UL (ref 0–0.7)
ERYTHROCYTE [DISTWIDTH] IN BLOOD BY AUTOMATED COUNT: 13.1 % (ref 11.5–14.5)
GFR SERPLBLD BASED ON 1.73 SQ M-ARVRAT: 96.4 ML/MIN
GLUCOSE SERPL-MCNC: 99 MG/DL (ref 70–99)
HCT VFR BLD CALC: 28.9 % (ref 36–47)
HGB BLD-MCNC: 9.8 G/DL (ref 12–15.5)
LYMPHOCYTES # BLD: 1 X10^3/UL (ref 1–4.8)
LYMPHOCYTES NFR BLD AUTO: 14 % (ref 24–48)
MAGNESIUM SERPL-MCNC: 2 MG/DL (ref 1.8–2.4)
MCH RBC QN AUTO: 30 PG (ref 25–35)
MCHC RBC AUTO-ENTMCNC: 34 G/DL (ref 31–37)
MCV RBC AUTO: 89 FL (ref 79–100)
MONO #: 1.1 X10^3/UL (ref 0–1.1)
MONOCYTES NFR BLD: 15 % (ref 0–9)
NEUT #: 4.9 X10^3/UL (ref 1.8–7.7)
NEUTROPHILS NFR BLD AUTO: 70 % (ref 31–73)
PHOSPHATE SERPL-MCNC: 1.9 MG/DL (ref 2.6–4.7)
PLATELET # BLD AUTO: 153 X10^3/UL (ref 140–400)
POTASSIUM SERPL-SCNC: 3.4 MMOL/L (ref 3.5–5.1)
RBC # BLD AUTO: 3.25 X10^6/UL (ref 3.5–5.4)
SODIUM SERPL-SCNC: 137 MMOL/L (ref 136–145)
WBC # BLD AUTO: 7 X10^3/UL (ref 4–11)

## 2020-12-21 RX ADMIN — BACITRACIN SCH MLS/HR: 5000 INJECTION, POWDER, FOR SOLUTION INTRAMUSCULAR at 05:35

## 2020-12-21 RX ADMIN — BACITRACIN SCH MLS/HR: 5000 INJECTION, POWDER, FOR SOLUTION INTRAMUSCULAR at 10:52

## 2020-12-21 RX ADMIN — PIPERACILLIN SODIUM AND TAZOBACTAM SODIUM SCH MLS/HR: 3; .375 INJECTION, POWDER, LYOPHILIZED, FOR SOLUTION INTRAVENOUS at 14:04

## 2020-12-21 RX ADMIN — POTASSIUM CITRATE SCH MEQ: 10 TABLET ORAL at 10:53

## 2020-12-21 RX ADMIN — KETOROLAC TROMETHAMINE PRN MG: 15 INJECTION, SOLUTION INTRAMUSCULAR; INTRAVENOUS at 05:35

## 2020-12-21 RX ADMIN — PIPERACILLIN SODIUM AND TAZOBACTAM SODIUM SCH MLS/HR: 3; .375 INJECTION, POWDER, LYOPHILIZED, FOR SOLUTION INTRAVENOUS at 06:08

## 2020-12-21 NOTE — NUR
Pt called for pain meds at [facility] - RN in another room. When went to pt room to double check - 
pt asleep and did not respond to name being called. Later pt informed tech that RN never 
came to room. RN went to room again shortly thereafter and pt again asleep.  Tech reported 
pt stated was in so much pain couldn't sleep, yet did not respond to verbal stimuli. Will 
continue to monitor.

## 2020-12-21 NOTE — PDOC
[facility] [facility] PROGRESS NOTE


Date of Service


DOS:


DATE: 12/21/20 


TIME: 13:18





Chief Complaint


Chief Complaint


Investigation for Covid 19 infection


Acute UTI possibly  pyelonephritis


Multiple nephro lithiasis bilateral


Acute electrolyte derangementhyponatremia, hypokalemia, hypochloremia 

suggestive of volume depletion


Severe protein malnutrition


Lactic acidemia


Tobacco misuse





Admit to medicine for further management


Continue empiric IV antibiotics


Continue IV fluids


Pending urine culture


We will start potassium citrate and tamsulosin for kidney stone prevention


Counseled on lemon juice water and increasing hydration and smoking cessation to

reduce kidney stones.


Strain urine


Recommend outpatient urology evaluation


Ambulation and SCD for DVT prophylaxis


Regular diet


Full code


Discussed with RN and SW


Disposition pending urine culture


Surrogate decision maker is undesignated





History of Present Illness


History of Present Illness


35  year old presents with chief complaint of dysuria.  Patient was here at the 

beginning of December was diagnosed with urinary tract infection.  Over the last

3 to 4 days patient's had progressively worsening symptoms that consist of back 

pain with dysuria.  Patient had also a headache and subjective fever and chills.

 Patient had some nausea as well.  Symptoms are worse with urination and 

palpation of her back.  Patient states her pain is in the back and radiates to 

her abdomen.  Pain is moderate at rest and more severe at times and severity.  

Symptoms are somewhat relieved with Tylenol but not completely.





12/21/2020


Patient seen and evaluated bedside.  She admits to history of kidney stones, and

notes some improvement in her left flank pain.  She denies any nausea or 

vomiting.  States she has not yet established primary care provider, and we 

discussed importance of doing so at this time.  Will discharge patient on oral 

antibiotics to complete her treatment as outpatient.  Patient 30 minutes was 

spent managing the discharge this patient.





Vitals/I&O


Vitals/I&O:





                                   Vital Signs








  Date Time  Temp Pulse Resp B/P (MAP) Pulse Ox O2 Delivery O2 Flow Rate FiO2


 


12/21/20 10:53 98.1 77 22 120/53 (75) 96 Room Air  





 98.1       














                                    I & O   


 


 12/20/20 12/20/20 12/21/20





 15:00 23:00 07:00


 


Intake Total 2005 ml 400 ml 1440 ml


 


Output Total 200 ml  


 


Balance 1805 ml 400 ml 1440 ml











Physical Exam


General:  Alert, No acute distress


Heart:  Regular rate


Lungs:  Clear


Abdomen:  Soft, No tenderness


Extremities:  No clubbing, No cyanosis


Skin:  No rashes, No breakdown





Labs


Labs:





Laboratory Tests








Test


 12/21/20


04:10


 


White Blood Count


 7.0 x10^3/uL


(4.0-11.0)


 


Red Blood Count


 3.25 x10^6/uL


(3.50-5.40)


 


Hemoglobin


 9.8 g/dL


(12.0-15.5)


 


Hematocrit


 28.9 %


(36.0-47.0)


 


Mean Corpuscular Volume 89 fL () 


 


Mean Corpuscular Hemoglobin 30 pg (25-35) 


 


Mean Corpuscular Hemoglobin


Concent 34 g/dL


(31-37)


 


Red Cell Distribution Width


 13.1 %


(11.5-14.5)


 


Platelet Count


 153 x10^3/uL


(140-400)


 


Neutrophils (%) (Auto) 70 % (31-73) 


 


Lymphocytes (%) (Auto) 14 % (24-48) 


 


Monocytes (%) (Auto) 15 % (0-9) 


 


Eosinophils (%) (Auto) 0 % (0-3) 


 


Basophils (%) (Auto) 0 % (0-3) 


 


Neutrophils # (Auto)


 4.9 x10^3/uL


(1.8-7.7)


 


Lymphocytes # (Auto)


 1.0 x10^3/uL


(1.0-4.8)


 


Monocytes # (Auto)


 1.1 x10^3/uL


(0.0-1.1)


 


Eosinophils # (Auto)


 0.0 x10^3/uL


(0.0-0.7)


 


Basophils # (Auto)


 0.0 x10^3/uL


(0.0-0.2)


 


Sodium Level


 137 mmol/L


(136-145)


 


Potassium Level


 3.4 mmol/L


(3.5-5.1)


 


Chloride Level


 103 mmol/L


()


 


Carbon Dioxide Level


 27 mmol/L


(21-32)


 


Anion Gap 7 (6-14) 


 


Blood Urea Nitrogen 8 mg/dL (7-20) 


 


Creatinine


 0.7 mg/dL


(0.6-1.0)


 


Estimated GFR


(Cockcroft-Gault) 96.4 





 


Glucose Level


 99 mg/dL


(70-99)


 


Calcium Level


 8.1 mg/dL


(8.5-10.1)


 


Phosphorus Level


 1.9 mg/dL


(2.6-4.7)


 


Magnesium Level


 2.0 mg/dL


(1.8-2.4)











Review of Systems


Review of Systems:


Left flank pain, dysuria.  Denies nausea, denies vomiting, denies chest pain, 

denies shortness of breath.





Assessment and Plan


Assessmemt and Plan


Problems


Medical Problems:


(1) Flank pain


Status: Acute  





(2) Pyelonephritis


Status: Acute  





(3) Sepsis


Status: Acute  





(4) Suspected COVID-19 virus infection


Status: Acute  





(5) Urinary tract infection


Status: Acute  











Comment


Review of Relevant


I have reviewed the following items marie (where applicable) has been applied.


Medications:





Current Medications








 Medications


  (Trade)  Dose


 Ordered  Sig/Gurwinder


 Route


 PRN Reason  Start Time


 Stop Time Status Last Admin


Dose Admin


 


 Tamsulosin HCl


  (Flomax)  0.4 mg  DAILY


 PO


   12/21/20 09:00


    12/21/20 08:19





 


 Ketorolac


 Tromethamine


  (Toradol 15mg


 Vial)  15 mg  PRN Q8HRS  PRN


 IVP


 PAIN  12/20/20 18:30


 12/22/20 18:30  12/21/20 05:35














Justifications for Admission


UTI Indications


Worse Clinical Findings?:  Yes


Justification for admission:  


Patient's worsening clinical findings as indicated (by a fever of & pain 


of) despite outpatient/observation treatment makes it imperative that 


patient is managed as an inpatient.





Other Justification














MADDIE MELVIN MD            Dec 21, 2020 13:22

## 2020-12-21 NOTE — NUR
SW following for discharge planning. Spoke with RN and reviewed chart. Pt COVID pending, 
regular diet, IV Zosyn, room air. Pt from home with family. Pt will likely discharge home 
today, self-care on oral medications. SW following and available as needed. No anticipated 
SW needs on discharge.

-------------------------------------------------------------------------------

Addendum: 12/21/20 at 1355 by KAMILAH MCFARLANE SW

-------------------------------------------------------------------------------

pt to discharge home, self-care on oral medications. No further SW needs

## 2020-12-21 NOTE — PDOC3
Discharge Summary


Visit Information


Date of Admission:  Dec 20, 2020


Date of Discharge:  Dec 21, 2020


Final Diagnosis


Problems


Medical Problems:


(1) Flank pain


Status: Acute  





(2) Pyelonephritis


Status: Acute  





(3) Sepsis


Status: Acute  





(4) Suspected COVID-19 virus infection


Status: Acute  





(5) Urinary tract infection


Status: Acute  











Brief Hospital Course


Allergies





                                    Allergies








Coded Allergies Type Severity Reaction Last Updated Verified


 


  Sulfa (Sulfonamide Antibiotics) Allergy Intermediate HIVES 12/1/20 Yes


 


  sulfamethoxazole Allergy Intermediate HIVES 12/1/20 Yes


 


  trimethoprim Allergy Intermediate HIVES 12/1/20 Yes








Vital Signs





Vital Signs








  Date Time  Temp Pulse Resp B/P (MAP) Pulse Ox O2 Delivery O2 Flow Rate FiO2


 


12/21/20 10:53 98.1 77 22 120/53 (75) 96 Room Air  





 98.1       








Lab Results





Laboratory Tests








Test


 12/20/20


07:21 12/20/20


07:30 12/20/20


11:00 12/21/20


04:10


 


Bedside Urine HCG, Qualitative


 Hcg negative


(Negative) 


 


 





 


White Blood Count


 


 8.5 x10^3/uL


(4.0-11.0) 


 7.0 x10^3/uL


(4.0-11.0)


 


Red Blood Count


 


 4.00 x10^6/uL


(3.50-5.40) 


 3.25 x10^6/uL


(3.50-5.40)


 


Hemoglobin


 


 12.1 g/dL


(12.0-15.5) 


 9.8 g/dL


(12.0-15.5)


 


Hematocrit


 


 35.3 %


(36.0-47.0) 


 28.9 %


(36.0-47.0)


 


Mean Corpuscular Volume  88 fL ()   89 fL () 


 


Mean Corpuscular Hemoglobin  30 pg (25-35)   30 pg (25-35) 


 


Mean Corpuscular Hemoglobin


Concent 


 34 g/dL


(31-37) 


 34 g/dL


(31-37)


 


Red Cell Distribution Width


 


 13.5 %


(11.5-14.5) 


 13.1 %


(11.5-14.5)


 


Platelet Count


 


 174 x10^3/uL


(140-400) 


 153 x10^3/uL


(140-400)


 


Neutrophils (%) (Auto)  79 % (31-73)   70 % (31-73) 


 


Lymphocytes (%) (Auto)  8 % (24-48)   14 % (24-48) 


 


Monocytes (%) (Auto)  13 % (0-9)   15 % (0-9) 


 


Eosinophils (%) (Auto)  0 % (0-3)   0 % (0-3) 


 


Basophils (%) (Auto)  0 % (0-3)   0 % (0-3) 


 


Neutrophils # (Auto)


 


 6.7 x10^3/uL


(1.8-7.7) 


 4.9 x10^3/uL


(1.8-7.7)


 


Lymphocytes # (Auto)


 


 0.7 x10^3/uL


(1.0-4.8) 


 1.0 x10^3/uL


(1.0-4.8)


 


Monocytes # (Auto)


 


 1.1 x10^3/uL


(0.0-1.1) 


 1.1 x10^3/uL


(0.0-1.1)


 


Eosinophils # (Auto)


 


 0.0 x10^3/uL


(0.0-0.7) 


 0.0 x10^3/uL


(0.0-0.7)


 


Basophils # (Auto)


 


 0.0 x10^3/uL


(0.0-0.2) 


 0.0 x10^3/uL


(0.0-0.2)


 


Urine Collection Type  Unknown   


 


Urine Color  Emilie   


 


Urine Clarity  Clear   


 


Urine pH  6.5 (<5.0-8.0)   


 


Urine Specific Gravity


 


 1.020


(1.000-1.030) 


 





 


Urine Protein


 


 100 mg/dL


(NEG-TRACE) 


 





 


Urine Glucose (UA)


 


 Negative mg/dL


(NEG) 


 





 


Urine Ketones (Stick)


 


 Negative mg/dL


(NEG) 


 





 


Urine Blood  Small (NEG)   


 


Urine Nitrite  Positive (NEG)   


 


Urine Bilirubin  Small (NEG)   


 


Urine Urobilinogen Dipstick


 


 4.0 mg/dL (0.2


mg/dL) 


 





 


Urine Leukocyte Esterase  Moderate (NEG)   


 


Urine RBC  3-5 /HPF (0-2)   


 


Urine WBC  >40 /HPF (0-4)   


 


Urine Squamous Epithelial


Cells 


 Mod /LPF 


 


 





 


Urine Bacteria


 


 Many /HPF


(0-FEW) 


 





 


Urine Mucus  Mod /LPF   


 


Sodium Level


 


 132 mmol/L


(136-145) 


 137 mmol/L


(136-145)


 


Potassium Level


 


 3.4 mmol/L


(3.5-5.1) 


 3.4 mmol/L


(3.5-5.1)


 


Chloride Level


 


 96 mmol/L


() 


 103 mmol/L


()


 


Carbon Dioxide Level


 


 24 mmol/L


(21-32) 


 27 mmol/L


(21-32)


 


Anion Gap  12 (6-14)   7 (6-14) 


 


Blood Urea Nitrogen


 


 10 mg/dL


(7-20) 


 8 mg/dL (7-20) 





 


Creatinine


 


 0.8 mg/dL


(0.6-1.0) 


 0.7 mg/dL


(0.6-1.0)


 


Estimated GFR


(Cockcroft-Gault) 


 82.6 


 


 96.4 





 


BUN/Creatinine Ratio  13 (6-20)   


 


Glucose Level


 


 158 mg/dL


(70-99) 


 99 mg/dL


(70-99)


 


Lactic Acid Level


 


 2.1 mmol/L


(0.4-2.0) 1.4 mmol/L


(0.4-2.0) 





 


Calcium Level


 


 9.0 mg/dL


(8.5-10.1) 


 8.1 mg/dL


(8.5-10.1)


 


Total Bilirubin


 


 0.5 mg/dL


(0.2-1.0) 


 





 


Aspartate Amino Transf


(AST/SGOT) 


 14 U/L (15-37) 


 


 





 


Alanine Aminotransferase


(ALT/SGPT) 


 17 U/L (14-59) 


 


 





 


Alkaline Phosphatase


 


 65 U/L


() 


 





 


Total Protein


 


 7.1 g/dL


(6.4-8.2) 


 





 


Albumin


 


 2.9 g/dL


(3.4-5.0) 


 





 


Albumin/Globulin Ratio  0.7 (1.0-1.7)   


 


Lipase


 


 102 U/L


() 


 





 


Phosphorus Level


 


 


 


 1.9 mg/dL


(2.6-4.7)


 


Magnesium Level


 


 


 


 2.0 mg/dL


(1.8-2.4)








Laboratory Tests








Test


 12/21/20


04:10


 


White Blood Count


 7.0 x10^3/uL


(4.0-11.0)


 


Red Blood Count


 3.25 x10^6/uL


(3.50-5.40)


 


Hemoglobin


 9.8 g/dL


(12.0-15.5)


 


Hematocrit


 28.9 %


(36.0-47.0)


 


Mean Corpuscular Volume 89 fL () 


 


Mean Corpuscular Hemoglobin 30 pg (25-35) 


 


Mean Corpuscular Hemoglobin


Concent 34 g/dL


(31-37)


 


Red Cell Distribution Width


 13.1 %


(11.5-14.5)


 


Platelet Count


 153 x10^3/uL


(140-400)


 


Neutrophils (%) (Auto) 70 % (31-73) 


 


Lymphocytes (%) (Auto) 14 % (24-48) 


 


Monocytes (%) (Auto) 15 % (0-9) 


 


Eosinophils (%) (Auto) 0 % (0-3) 


 


Basophils (%) (Auto) 0 % (0-3) 


 


Neutrophils # (Auto)


 4.9 x10^3/uL


(1.8-7.7)


 


Lymphocytes # (Auto)


 1.0 x10^3/uL


(1.0-4.8)


 


Monocytes # (Auto)


 1.1 x10^3/uL


(0.0-1.1)


 


Eosinophils # (Auto)


 0.0 x10^3/uL


(0.0-0.7)


 


Basophils # (Auto)


 0.0 x10^3/uL


(0.0-0.2)


 


Sodium Level


 137 mmol/L


(136-145)


 


Potassium Level


 3.4 mmol/L


(3.5-5.1)


 


Chloride Level


 103 mmol/L


()


 


Carbon Dioxide Level


 27 mmol/L


(21-32)


 


Anion Gap 7 (6-14) 


 


Blood Urea Nitrogen 8 mg/dL (7-20) 


 


Creatinine


 0.7 mg/dL


(0.6-1.0)


 


Estimated GFR


(Cockcroft-Gault) 96.4 





 


Glucose Level


 99 mg/dL


(70-99)


 


Calcium Level


 8.1 mg/dL


(8.5-10.1)


 


Phosphorus Level


 1.9 mg/dL


(2.6-4.7)


 


Magnesium Level


 2.0 mg/dL


(1.8-2.4)








Brief Hospital Course


Ms. Crisostomo  is a 35 old female who presented with pyelonephritis, nephrolithiasis

.  She was treated with IV antibiotics.  She was discharged home the next day 

with oral antibiotics and pain medication.  She was recommended to establish 

primary care due to her history of nephrolithiasis.





Discharge Information


Condition at Discharge:  Improved


Disposition/Orders:  D/C to Home


Scheduled


Cephalexin (Keflex) 500 Mg Capsule, 1 CAP PO BID for 7 Days, #14 Ref 0


   Prescribed by: BERNABE VINCENT on 12/1/20 7987


Phenazopyridine Hcl (Pyridium) 100 Mg Tablet, 1 TAB PO TID for urinary 

discomfort for 3 Days, #9 Ref 0


   Prescribed by: BERNABE VINCENT on 12/1/20 1853





Scheduled PRN


Tramadol Hcl (Tramadol Hcl) 50 Mg Tablet, 50 MG PO Q6HRS PRN for PAIN, #28 Ref 0


   Prescribed by: MADDIE MELVIN MD on 12/21/20 1326





Justicifation of Admission Dx:


Justifications for Admission:


Justification of Admission Dx:  Yes (Pyelonephritis)











MADDIE MELVIN MD            Dec 21, 2020 13:29

## 2021-05-07 ENCOUNTER — HOSPITAL ENCOUNTER (EMERGENCY)
Dept: HOSPITAL 61 - ER | Age: 36
Discharge: HOME | End: 2021-05-07
Payer: COMMERCIAL

## 2021-05-07 VITALS — DIASTOLIC BLOOD PRESSURE: 75 MMHG | SYSTOLIC BLOOD PRESSURE: 112 MMHG

## 2021-05-07 VITALS — HEIGHT: 63 IN | BODY MASS INDEX: 51.91 KG/M2 | WEIGHT: 293 LBS

## 2021-05-07 DIAGNOSIS — Z88.2: ICD-10-CM

## 2021-05-07 DIAGNOSIS — N12: Primary | ICD-10-CM

## 2021-05-07 DIAGNOSIS — R30.0: ICD-10-CM

## 2021-05-07 DIAGNOSIS — Z87.440: ICD-10-CM

## 2021-05-07 DIAGNOSIS — Z88.1: ICD-10-CM

## 2021-05-07 DIAGNOSIS — Z87.442: ICD-10-CM

## 2021-05-07 DIAGNOSIS — R51.9: ICD-10-CM

## 2021-05-07 DIAGNOSIS — Z88.0: ICD-10-CM

## 2021-05-07 DIAGNOSIS — Z98.51: ICD-10-CM

## 2021-05-07 DIAGNOSIS — F17.200: ICD-10-CM

## 2021-05-07 LAB
ANION GAP SERPL CALC-SCNC: 8 MMOL/L (ref 6–14)
APTT PPP: YELLOW S
BACTERIA #/AREA URNS HPF: (no result) /HPF
BASOPHILS # BLD AUTO: 0 X10^3/UL (ref 0–0.2)
BASOPHILS NFR BLD: 0 % (ref 0–3)
BILIRUB UR QL STRIP: NEGATIVE
BUN SERPL-MCNC: 9 MG/DL (ref 7–20)
CALCIUM SERPL-MCNC: 8.6 MG/DL (ref 8.5–10.1)
CHLORIDE SERPL-SCNC: 97 MMOL/L (ref 98–107)
CO2 SERPL-SCNC: 28 MMOL/L (ref 21–32)
CREAT SERPL-MCNC: 0.7 MG/DL (ref 0.6–1)
EOSINOPHIL NFR BLD: 0 % (ref 0–3)
EOSINOPHIL NFR BLD: 0 X10^3/UL (ref 0–0.7)
ERYTHROCYTE [DISTWIDTH] IN BLOOD BY AUTOMATED COUNT: 12.6 % (ref 11.5–14.5)
FIBRINOGEN PPP-MCNC: CLEAR MG/DL
GFR SERPLBLD BASED ON 1.73 SQ M-ARVRAT: 95.8 ML/MIN
GLUCOSE SERPL-MCNC: 105 MG/DL (ref 70–99)
HCT VFR BLD CALC: 38.8 % (ref 36–47)
HGB BLD-MCNC: 13.2 G/DL (ref 12–15.5)
LYMPHOCYTES # BLD: 0.7 X10^3/UL (ref 1–4.8)
LYMPHOCYTES NFR BLD AUTO: 5 % (ref 24–48)
MCH RBC QN AUTO: 30 PG (ref 25–35)
MCHC RBC AUTO-ENTMCNC: 34 G/DL (ref 31–37)
MCV RBC AUTO: 89 FL (ref 79–100)
MONO #: 1.5 X10^3/UL (ref 0–1.1)
MONOCYTES NFR BLD: 12 % (ref 0–9)
NEUT #: 10.4 X10^3/UL (ref 1.8–7.7)
NEUTROPHILS NFR BLD AUTO: 83 % (ref 31–73)
NITRITE UR QL STRIP: POSITIVE
PH UR STRIP: 7.5 [PH]
PLATELET # BLD AUTO: 175 X10^3/UL (ref 140–400)
POTASSIUM SERPL-SCNC: 4.2 MMOL/L (ref 3.5–5.1)
PROT UR STRIP-MCNC: 30 MG/DL
RBC # BLD AUTO: 4.37 X10^6/UL (ref 3.5–5.4)
RBC #/AREA URNS HPF: (no result) /HPF (ref 0–2)
SODIUM SERPL-SCNC: 133 MMOL/L (ref 136–145)
UROBILINOGEN UR-MCNC: 1 MG/DL
WBC # BLD AUTO: 12.6 X10^3/UL (ref 4–11)
WBC #/AREA URNS HPF: >40 /HPF (ref 0–4)

## 2021-05-07 PROCEDURE — 96361 HYDRATE IV INFUSION ADD-ON: CPT

## 2021-05-07 PROCEDURE — 99284 EMERGENCY DEPT VISIT MOD MDM: CPT

## 2021-05-07 PROCEDURE — 80048 BASIC METABOLIC PNL TOTAL CA: CPT

## 2021-05-07 PROCEDURE — 36415 COLL VENOUS BLD VENIPUNCTURE: CPT

## 2021-05-07 PROCEDURE — 96374 THER/PROPH/DIAG INJ IV PUSH: CPT

## 2021-05-07 PROCEDURE — 81001 URINALYSIS AUTO W/SCOPE: CPT

## 2021-05-07 PROCEDURE — 87086 URINE CULTURE/COLONY COUNT: CPT

## 2021-05-07 PROCEDURE — 81025 URINE PREGNANCY TEST: CPT

## 2021-05-07 PROCEDURE — 96375 TX/PRO/DX INJ NEW DRUG ADDON: CPT

## 2021-05-07 PROCEDURE — 85025 COMPLETE CBC W/AUTO DIFF WBC: CPT

## 2021-05-07 NOTE — ED.ADGEN
Past Medical History


Past Medical History:  Kidney Infection, Kidney Stone, UTI


Past Surgical History:  Tubal ligation


Smoking Status:  Current Every Day Smoker


Alcohol Use:  Occasionally


Drug Use:  None





General Adult


EDM:


Chief Complaint:  PAIN ON URINATION





HPI:


HPI:





Patient is a 36  year old female who presents emergency department with 

complaints of low back pain, dysuria, body aches, fever, and headache for the 

last 3 days.  Patient reports she has had increased urinary frequency, dysuria, 

and urgency.  She denies any hematuria, she does report foul-smelling urine.  

Patient denies any abnormal vaginal discharge, vaginal odor, or concerns of 

sexually transmitted infection.  She does complain of nausea but denies any 

abdominal pain, vomiting, or diarrhea.  She currently rates her discomfort a 9 

out of 10 on the pain scale, she denies any alleviating factors pain is worse 

with urination.





Review of Systems:


Review of Systems:


Complete ROS is negative unless otherwise noted in HPI.





Current Medications:





Current Medications








 Medications


  (Trade)  Dose


 Ordered  Sig/Gurwinder  Start Time


 Stop Time Status Last Admin


Dose Admin


 


 Acetaminophen


  (Tylenol)  1,000 mg  1X  ONCE  5/7/21 21:15


 5/7/21 21:20 DC  





 


 Ceftriaxone Sodium


  (Rocephin)  1 gm  1X  ONCE  5/7/21 21:00


 5/7/21 21:01 DC 5/7/21 21:20


1 GM


 


 Ondansetron HCl


  (Zofran)  4 mg  1X  ONCE  5/7/21 21:00


 5/7/21 21:01 DC 5/7/21 21:20


4 MG


 


 Sodium Chloride  1,000 ml @ 


 1,000 mls/hr  1X  ONCE  5/7/21 21:00


 5/7/21 21:59  5/7/21 21:20


1,000 MLS/HR











Allergies:


Allergies:





Allergies








Coded Allergies Type Severity Reaction Last Updated Verified


 


  Penicillins Allergy Intermediate Hives 5/7/21 Yes


 


  Sulfa (Sulfonamide Antibiotics) Allergy Intermediate HIVES 12/1/20 Yes


 


  sulfamethoxazole Allergy Intermediate HIVES 12/1/20 Yes


 


  trimethoprim Allergy Intermediate HIVES 12/1/20 Yes











Physical Exam:


PE:


See Above


Constitutional: Well developed, well nourished, no acute distress, non-toxic 

appearance. []


HENT: Normocephalic, atraumatic, bilateral external ears normal, nose normal. []


Eyes: PERRLA, EOMI, conjunctiva normal, no discharge. [] 


Neck: Normal range of motion, no stridor. [] 


Cardiovascular:Heart rate regular tachycardic rhythm


Lungs & Thorax:  Respirations even and unlabored, no retractions, no respiratory

 distress


Abdomen: soft, suprapubic tenderness otherwise nontender to palpation, no 

palpable mass, no pulsatile mass,


Back: No bony tenderness, bilateral CVA tenderness


Skin: Flushed, hot, dry, no rash. [] 


Extremities: No cyanosis, ROM intact, no edema. [] 


Neurologic: Alert and oriented X 3, motor, normal sensory, no focal deficits 

noted. []


Psychologic: Affect normal, judgement normal, mood normal. []





Current Patient Data:


Labs:





                                Laboratory Tests








Test


 5/7/21


19:40 5/7/21


20:15 5/7/21


21:07


 


Urine Collection Type Void    


 


Urine Color Yellow    


 


Urine Clarity Clear    


 


Urine pH


 7.5 (<5.0-8.0)


 


 





 


Urine Specific Gravity


 1.015


(1.000-1.030) 


 





 


Urine Protein


 30 mg/dL


(NEG-TRACE) 


 





 


Urine Glucose (UA)


 Negative mg/dL


(NEG) 


 





 


Urine Ketones (Stick)


 >=80 mg/dL


(NEG) 


 





 


Urine Blood


 Negative (NEG)


 


 





 


Urine Nitrite


 Positive (NEG)


 


 





 


Urine Bilirubin


 Negative (NEG)


 


 





 


Urine Urobilinogen Dipstick


 1.0 mg/dL (0.2


mg/dL) 


 





 


Urine Leukocyte Esterase


 Moderate (NEG)


 


 





 


Urine RBC


 3-5 /HPF (0-2)


 


 





 


Urine WBC


 >40 /HPF (0-4)


 


 





 


Urine Squamous Epithelial


Cells Many /LPF  


 


 





 


Urine Bacteria


 Many /HPF


(0-FEW) 


 





 


Urine Mucus Mod /LPF    


 


POC Urine HCG, Qualitative


 


 Hcg negative


(Negative) 





 


White Blood Count


 


 


 12.6 x10^3/uL


(4.0-11.0)  H


 


Red Blood Count


 


 


 4.37 x10^6/uL


(3.50-5.40)


 


Hemoglobin


 


 


 13.2 g/dL


(12.0-15.5)


 


Hematocrit


 


 


 38.8 %


(36.0-47.0)


 


Mean Corpuscular Volume


 


 


 89 fL ()





 


Mean Corpuscular Hemoglobin   30 pg (25-35)  


 


Mean Corpuscular Hemoglobin


Concent 


 


 34 g/dL


(31-37)


 


Red Cell Distribution Width


 


 


 12.6 %


(11.5-14.5)


 


Platelet Count


 


 


 175 x10^3/uL


(140-400)


 


Neutrophils (%) (Auto)   83 % (31-73)  H


 


Lymphocytes (%) (Auto)   5 % (24-48)  L


 


Monocytes (%) (Auto)   12 % (0-9)  H


 


Eosinophils (%) (Auto)   0 % (0-3)  


 


Basophils (%) (Auto)   0 % (0-3)  


 


Neutrophils # (Auto)


 


 


 10.4 x10^3/uL


(1.8-7.7)  H


 


Lymphocytes # (Auto)


 


 


 0.7 x10^3/uL


(1.0-4.8)  L


 


Monocytes # (Auto)


 


 


 1.5 x10^3/uL


(0.0-1.1)  H


 


Eosinophils # (Auto)


 


 


 0.0 x10^3/uL


(0.0-0.7)


 


Basophils # (Auto)


 


 


 0.0 x10^3/uL


(0.0-0.2)


 


Sodium Level


 


 


 133 mmol/L


(136-145)  L


 


Potassium Level


 


 


 4.2 mmol/L


(3.5-5.1)


 


Chloride Level


 


 


 97 mmol/L


()  L


 


Carbon Dioxide Level


 


 


 28 mmol/L


(21-32)


 


Anion Gap   8 (6-14)  


 


Blood Urea Nitrogen


 


 


 9 mg/dL (7-20)





 


Creatinine


 


 


 0.7 mg/dL


(0.6-1.0)


 


Estimated GFR


(Cockcroft-Gault) 


 


 95.8  





 


Glucose Level


 


 


 105 mg/dL


(70-99)  H


 


Calcium Level


 


 


 8.6 mg/dL


(8.5-10.1)





                                Laboratory Tests


5/7/21 21:07








                                Laboratory Tests


5/7/21 21:07











Vital Signs:





                                   Vital Signs








  Date Time  Temp Pulse Resp B/P (MAP) Pulse Ox O2 Delivery O2 Flow Rate FiO2


 


5/7/21 20:29 99.1 112 16 112/75 (87) 99 Room Air  





 99.1       











EKG:


EKG:


[]





Heart Score:


C/O Chest Pain:  No


Risk Scores:


Score 0 - 3:  2.5% MACE over next 6 weeks - Discharge Home


Score 4 - 6:  20.3% MACE over next 6 weeks - Admit for Clinical Observation


Score 7 - 10:  72.7% MACE over next 6 weeks - Early Invasive Strategies





Radiology/Procedures:


Radiology/Procedures:


[]





Course & Med Decision Making:


Course & Med Decision Making


Pertinent Labs and Imaging studies reviewed. (See chart for details)


36-year-old female presents emergency department with concerns of urinary tract 

infection.





Patient's urinalysis is concerning for urinary tract infection with nitrites 

positive, moderate leuk esterase, greater than 40 white blood cells, and many 

bacteria, urine pregnancy is negative.


Patient CBC reveals white blood cell count of 12.6, otherwise unremarkable; BMP 

reveals sodium of 133, chloride of 97, glucose of 105


Patient was given a liter normal saline, 4 mg of Zofran, 1 g of Rocephin, and a 

gram of Tylenol in the emergency department, she reported feeling better after 

these medications.  Patient declined admission to the hospital for further 

treatment of pyelonephritis.  Prescriptions were written for Cipro 500 mg p.o. 

twice daily x7 days, Pyridium 200 mg every 8 hours as needed dysuria, Zofran 4 m

g every 6 hours as needed nausea, and hydrocodone 5/325 milligrams tablets 1 

every 6 as needed pain #12





Patient was encouraged to increase clear fluids, avoid bladder irritants, and 

return to ER if symptoms worsened, she was unable to keep medications down, or 

fever did not respond to Tylenol and ibuprofen





Patient verbalized an understanding of home care, medications, follow-up, and 

return to ED instructions and was in agreement with the plan of care.





Dragon Disclaimer:


Dragon Disclaimer:


This electronic medical record was generated, in whole or in part, using a voice

 recognition dictation system.





Departure


Departure


Impression:  


   Primary Impression:  


   Pyelonephritis


   Additional Impressions:  


   Nausea


   Dysuria


Disposition:  01 HOME / SELF CARE / HOMELESS


Condition:  STABLE


Referrals:  


NO PCP (PCP)


Patient Instructions:  Dysuria-Brief, Pyelonephritis, Adult, Easy-to-Read





Additional Instructions:  


Fill prescription(s) and take as directed. Avoid bladder irritants such as 

caffeine, carbonation, and spicy foods. Increase clear fluids. Follow up with 

your primary care doctor in 1-2 days, return to the ER if symptoms worsen or 

fever develops. 





[facility]


[address] [place]  [address]


[phone]





[facility]


[address], [place]  [id number] [phone]





[address]  [address] [phone]





[facility]


[address]


[place]  [address]


[phone]





[facility]


[address] [facility] [address] [facility] [place]  [address]


[phone]





[facility]


[address]


[place]  [address]


[phone]





[facility]


[address]


[place]  [address]


[phone]





[facility]


[address]


[place]  [address]


[phone]





[facility] 


[address]


[place]  [address]


[phone]





[facility]


[address]


[place]  [address]


[phone]


Scripts


Hydrocodone Bit/Acetaminophen (HYDROCODONE-APAP 5-325  **) 1 Tab Tablet


1 TAB PO PRN Q6HRS PRN for PAIN for 4 Days, #16 TAB 0 Refills


   Prov: WAYLON HIDALGO         5/7/21 


Phenazopyridine Hcl (PYRIDIUM) 200 Mg Tablet


1 TAB PO TID for urinary discomfort for 4 Days, #12 TAB 0 Refills


   Prov: WAYLON HIDALGO         5/7/21 


Ondansetron (ONDANSETRON ODT) 4 Mg Tab.rapdis


1 TAB PO PRN Q6-8HRS PRN for NAUSEA/VOMITING for 4 Days, #16 TAB 0 Refills


   Prov: WAYLON HIDALGO         5/7/21 


Ciprofloxacin Hcl (CIPRO) 500 Mg Tablet


1 TAB PO BID for 7 Days, #14 TAB 0 Refills


   Prov: WAYLON HIDALGO         5/7/21





Problem Qualifiers











WAYLON HIDALGO        May 7, 2021 21:47

## 2021-07-20 ENCOUNTER — HOSPITAL ENCOUNTER (EMERGENCY)
Dept: HOSPITAL 61 - ER | Age: 36
Discharge: HOME | End: 2021-07-20
Payer: COMMERCIAL

## 2021-07-20 VITALS — WEIGHT: 293 LBS | BODY MASS INDEX: 53.92 KG/M2 | HEIGHT: 62 IN

## 2021-07-20 VITALS — DIASTOLIC BLOOD PRESSURE: 82 MMHG | SYSTOLIC BLOOD PRESSURE: 128 MMHG

## 2021-07-20 DIAGNOSIS — Z88.1: ICD-10-CM

## 2021-07-20 DIAGNOSIS — F17.200: ICD-10-CM

## 2021-07-20 DIAGNOSIS — N93.9: Primary | ICD-10-CM

## 2021-07-20 DIAGNOSIS — Z98.51: ICD-10-CM

## 2021-07-20 DIAGNOSIS — Z88.0: ICD-10-CM

## 2021-07-20 DIAGNOSIS — Z88.2: ICD-10-CM

## 2021-07-20 DIAGNOSIS — Z87.442: ICD-10-CM

## 2021-07-20 LAB
APTT PPP: YELLOW S
BACTERIA #/AREA URNS HPF: (no result) /HPF
BASOPHILS # BLD AUTO: 0 X10^3/UL (ref 0–0.2)
BASOPHILS NFR BLD: 1 % (ref 0–3)
BILIRUB UR QL STRIP: NEGATIVE
EOSINOPHIL NFR BLD: 0 % (ref 0–3)
EOSINOPHIL NFR BLD: 0 X10^3/UL (ref 0–0.7)
ERYTHROCYTE [DISTWIDTH] IN BLOOD BY AUTOMATED COUNT: 13.4 % (ref 11.5–14.5)
FIBRINOGEN PPP-MCNC: CLEAR MG/DL
HCT VFR BLD CALC: 40.2 % (ref 36–47)
HGB BLD-MCNC: 13.7 G/DL (ref 12–15.5)
LYMPHOCYTES # BLD: 1 X10^3/UL (ref 1–4.8)
LYMPHOCYTES NFR BLD AUTO: 33 % (ref 24–48)
MCH RBC QN AUTO: 31 PG (ref 25–35)
MCHC RBC AUTO-ENTMCNC: 34 G/DL (ref 31–37)
MCV RBC AUTO: 90 FL (ref 79–100)
MONO #: 0.3 X10^3/UL (ref 0–1.1)
MONOCYTES NFR BLD: 11 % (ref 0–9)
NEUT #: 1.7 X10^3/UL (ref 1.8–7.7)
NEUTROPHILS NFR BLD AUTO: 55 % (ref 31–73)
NITRITE UR QL STRIP: NEGATIVE
PH UR STRIP: 7 [PH]
PLATELET # BLD AUTO: 178 X10^3/UL (ref 140–400)
PROT UR STRIP-MCNC: NEGATIVE MG/DL
RBC # BLD AUTO: 4.45 X10^6/UL (ref 3.5–5.4)
RBC #/AREA URNS HPF: (no result) /HPF (ref 0–2)
UROBILINOGEN UR-MCNC: 0.2 MG/DL
WBC # BLD AUTO: 3.1 X10^3/UL (ref 4–11)
WBC #/AREA URNS HPF: (no result) /HPF (ref 0–4)

## 2021-07-20 PROCEDURE — 76856 US EXAM PELVIC COMPLETE: CPT

## 2021-07-20 PROCEDURE — 81001 URINALYSIS AUTO W/SCOPE: CPT

## 2021-07-20 PROCEDURE — 36415 COLL VENOUS BLD VENIPUNCTURE: CPT

## 2021-07-20 PROCEDURE — 85025 COMPLETE CBC W/AUTO DIFF WBC: CPT

## 2021-07-20 PROCEDURE — 81025 URINE PREGNANCY TEST: CPT

## 2021-07-20 PROCEDURE — 76830 TRANSVAGINAL US NON-OB: CPT

## 2021-07-20 NOTE — ED.ADGEN
Past Medical History


Past Medical History:  Kidney Infection, Kidney Stone, UTI


Additional Past Medical Histor:  ovarian cysts


Past Surgical History:  Tubal ligation


Smoking Status:  Current Every Day Smoker


Alcohol Use:  Occasionally


Drug Use:  None





General Adult


EDM:


Chief Complaint:  VAGINAL BLEEDING





HPI:


HPI:





Patient is a 36  year old [f__sex] who presents with []





Review of Systems:


Review of Systems:


Constitutional:   Denies fever or chills. []


Eyes:   Denies change in visual acuity. []


HENT:   Denies nasal congestion or sore throat. [] 


Respiratory:   Denies cough or shortness of breath. [] 


Cardiovascular:   Denies chest pain or edema. [] 


GI:   Denies abdominal pain, nausea, vomiting, bloody stools or diarrhea. [] 


:  Denies dysuria. [] 


Musculoskeletal:   Denies back pain or joint pain. [] 


Integument:   Denies rash. [] 


Neurologic:   Denies headache, focal weakness or sensory changes. [] 


Endocrine:   Denies polyuria or polydipsia. [] 


Lymphatic:  Denies swollen glands. [] 


Psychiatric:  Denies depression or anxiety. []





Allergies:


Allergies:





Allergies








Coded Allergies Type Severity Reaction Last Updated Verified


 


  Penicillins Allergy Intermediate Hives 5/7/21 Yes


 


  Sulfa (Sulfonamide Antibiotics) Allergy Intermediate HIVES 12/1/20 Yes


 


  sulfamethoxazole Allergy Intermediate HIVES 12/1/20 Yes


 


  trimethoprim Allergy Intermediate HIVES 12/1/20 Yes











Physical Exam:


PE:





Constitutional: Well developed, well nourished, no acute distress, non-toxic 

appearance. []


HENT: Normocephalic, atraumatic, bilateral external ears normal, oropharynx 

moist, no oral exudates, nose normal. []


Eyes: PERRLA, EOMI, conjunctiva normal, no discharge. [] 


Neck: Normal range of motion, no tenderness, supple, no stridor. [] 


Cardiovascular:Heart rate regular rhythm, no murmur []


Lungs & Thorax:  Bilateral breath sounds clear to auscultation []


Abdomen: Bowel sounds normal, soft, no tenderness, no masses, no pulsatile 

masses. [] 


Skin: Warm, dry, no erythema, no rash. [] 


Back: No tenderness, no CVA tenderness. [] 


Extremities: No tenderness, no cyanosis, no clubbing, ROM intact, no edema. [] 


Neurologic: Alert and oriented X 3, normal motor function, normal sensory 

function, no focal deficits noted. []


Psychologic: Affect normal, judgement normal, mood normal. []





Current Patient Data:


Labs:





                                Laboratory Tests








Test


 7/20/21


14:25 7/20/21


14:32 7/20/21


14:34


 


White Blood Count


 3.1 x10^3/uL


(4.0-11.0)  L 


 





 


Red Blood Count


 4.45 x10^6/uL


(3.50-5.40) 


 





 


Hemoglobin


 13.7 g/dL


(12.0-15.5) 


 





 


Hematocrit


 40.2 %


(36.0-47.0) 


 





 


Mean Corpuscular Volume


 90 fL ()


 


 





 


Mean Corpuscular Hemoglobin 31 pg (25-35)    


 


Mean Corpuscular Hemoglobin


Concent 34 g/dL


(31-37) 


 





 


Red Cell Distribution Width


 13.4 %


(11.5-14.5) 


 





 


Platelet Count


 178 x10^3/uL


(140-400) 


 





 


Neutrophils (%) (Auto) 55 % (31-73)    


 


Lymphocytes (%) (Auto) 33 % (24-48)    


 


Monocytes (%) (Auto) 11 % (0-9)  H  


 


Eosinophils (%) (Auto) 0 % (0-3)    


 


Basophils (%) (Auto) 1 % (0-3)    


 


Neutrophils # (Auto)


 1.7 x10^3/uL


(1.8-7.7)  L 


 





 


Lymphocytes # (Auto)


 1.0 x10^3/uL


(1.0-4.8) 


 





 


Monocytes # (Auto)


 0.3 x10^3/uL


(0.0-1.1) 


 





 


Eosinophils # (Auto)


 0.0 x10^3/uL


(0.0-0.7) 


 





 


Basophils # (Auto)


 0.0 x10^3/uL


(0.0-0.2) 


 





 


Urine Collection Type  Unknown   


 


Urine Color  Yellow   


 


Urine Clarity  Clear   


 


Urine pH


 


 7.0 (<5.0-8.0)


 





 


Urine Specific Gravity


 


 1.020


(1.000-1.030) 





 


Urine Protein


 


 Negative mg/dL


(NEG-TRACE) 





 


Urine Glucose (UA)


 


 Negative mg/dL


(NEG) 





 


Urine Ketones (Stick)


 


 Negative mg/dL


(NEG) 





 


Urine Blood  Small (NEG)   


 


Urine Nitrite


 


 Negative (NEG)


 





 


Urine Bilirubin


 


 Negative (NEG)


 





 


Urine Urobilinogen Dipstick


 


 0.2 mg/dL (0.2


mg/dL) 





 


Urine Leukocyte Esterase


 


 Negative (NEG)


 





 


Urine RBC


 


 Rare /HPF


(0-2) 





 


Urine WBC


 


 Occ /HPF (0-4)


 





 


Urine Squamous Epithelial


Cells 


 Few /LPF  


 





 


Urine Bacteria


 


 Few /HPF


(0-FEW) 





 


Urine Mucus  Mod /LPF   


 


POC Urine HCG, Qualitative


 


 


 Hcg negative


(Negative)





                                Laboratory Tests


7/20/21 14:25











Vital Signs:





                                   Vital Signs








  Date Time  Temp Pulse Resp B/P (MAP) Pulse Ox O2 Delivery O2 Flow Rate FiO2


 


7/20/21 13:56 98.3 98 12 129/90 (87) 99 Room Air  





 98.3       











EKG:


EKG:


[]





Heart Score:


Risk Factors:


Risk Factors:  DM, Current or recent (<one month) smoker, HTN, HLP, family 

history of CAD, obesity.


Risk Scores:


Score 0 - 3:  2.5% MACE over next 6 weeks - Discharge Home


Score 4 - 6:  20.3% MACE over next 6 weeks - Admit for Clinical Observation


Score 7 - 10:  72.7% MACE over next 6 weeks - Early Invasive Strategies





Radiology/Procedures:


Radiology/Procedures:


[]





Course & Med Decision Making:


Course & Med Decision Making


Pertinent Labs and Imaging studies reviewed. (See chart for details)





[]





Dragon Disclaimer:


Dragon Disclaimer:


This electronic medical record was generated, in whole or in part, using a voice

 recognition dictation system.





Departure


Departure


Impression:  


   Primary Impression:  


   Vaginal bleeding


Disposition:  01 HOME / SELF CARE / HOMELESS


Condition:  STABLE


Referrals:  


NO PCP (PCP)


Patient Instructions:  Dysmenorrhea











BHARATH HSU MD            Jul 20, 2021 16:21

## 2021-07-20 NOTE — RAD
US PELVIS W/TV



History: Reason: bilateral pelvic pain, bleeding / Spl. Instructions:  / History: 



Comparison: None.



Technique: Sonographic examination of the pelvis was performed with transabdominal and transvaginal t
echnique.



Findings:

Uterus-

Size: 8.6 x 4.3 x 6.3 cm.

Uterine parenchyma: Homogeneous without fibroids.

Cervix: Unremarkable.



Endometrium- 

Endometrial Stripe: No abnormal fluid collections in the endometrial cavity, no obvious mass, and no 
abnormal blood flow within the endometrium by Doppler.

Thickness:  6 mm.



Adnexa- 

Right Ovary: Identified and appears normal.

Size: 3.1 x 1.8 x 1.4 cm.

Doppler: Normal.



Left Ovary: Identified and appears normal.

Size: 3.0 x 1.4 x 1.1 cm.

Doppler: Normal.



Mass: No abnormal adnexal masses.



Fluid: No abnormal free fluid in the pelvis.

  

Additional findings: None.





Impression: 

1.  No significant pelvic abnormality.



Electronically signed by: Reyes Gastelum MD (7/20/2021 3:21 PM) [facility]